# Patient Record
Sex: MALE | Race: WHITE | URBAN - METROPOLITAN AREA
[De-identification: names, ages, dates, MRNs, and addresses within clinical notes are randomized per-mention and may not be internally consistent; named-entity substitution may affect disease eponyms.]

---

## 2017-03-03 DIAGNOSIS — G89.29 CHRONIC ABDOMINAL PAIN: ICD-10-CM

## 2017-03-03 DIAGNOSIS — I10 ESSENTIAL HYPERTENSION, BENIGN: ICD-10-CM

## 2017-03-03 DIAGNOSIS — R10.9 CHRONIC ABDOMINAL PAIN: ICD-10-CM

## 2017-03-03 RX ORDER — PANTOPRAZOLE SODIUM 40 MG/1
TABLET, DELAYED RELEASE ORAL
Qty: 90 TABLET | Refills: 0 | Status: SHIPPED | OUTPATIENT
Start: 2017-03-03 | End: 2017-04-27

## 2017-03-03 NOTE — LETTER
[facility] PRIMARY [facility]ARE [facility]IN[facility]  [address]  [address] [facility] [address]      March 3, 2017      Miki Busch  [address]  [place] [address]        Dear Miki,    This letter is a reminder that you are due to see your Primary Care Provider for an Annual Visit and needed labs. You must be seen by your Primary Care Provider on a yearly basis and have appropriate labs drawn for continued care and prescription refills. Please call [phone] to schedule an appointment for an Annual Visit with Dr Mundo JAMES.     [facility],    Primary Care Center

## 2017-03-03 NOTE — TELEPHONE ENCOUNTER
pantprazole   Last Written Prescription Date:  3/9/16  Last Fill Quantity: 90,   # refills: 3  Last Office Visit : 3/9/16  Future Office visit:  No future appt letter sent

## 2017-03-15 DIAGNOSIS — I10 ESSENTIAL HYPERTENSION, BENIGN: ICD-10-CM

## 2017-03-15 RX ORDER — METOPROLOL TARTRATE 50 MG
50 TABLET ORAL 2 TIMES DAILY
Qty: 60 TABLET | Refills: 0 | Status: SHIPPED | OUTPATIENT
Start: 2017-03-15 | End: 2017-04-14

## 2017-03-15 NOTE — TELEPHONE ENCOUNTER
Metoprolol  Last Written Prescription Date: 3/9/16  Last Fill Quantity: 180, # refills: 3  Last Office Visit with G, P or [facility] prescribing provider: 3/9/16  No future appt letter sent       Potassium   Date Value Ref Range Status   03/09/2016 4.8 3.4 - 5.3 mmol/L Final     Creatinine   Date Value Ref Range Status   03/09/2016 0.86 0.66 - 1.25 mg/dL Final     BP Readings from Last 3 Encounters:   03/09/16 139/87   01/27/15 136/83   04/19/13 174/85

## 2017-03-21 DIAGNOSIS — G47.00 INSOMNIA, UNSPECIFIED: ICD-10-CM

## 2017-03-21 DIAGNOSIS — Z53.9 DIAGNOSIS NOT YET DEFINED: ICD-10-CM

## 2017-03-21 RX ORDER — MIRTAZAPINE 15 MG/1
TABLET, ORALLY DISINTEGRATING ORAL
Qty: 30 TABLET | Refills: 0 | Status: SHIPPED | OUTPATIENT
Start: 2017-03-21 | End: 2017-04-25

## 2017-03-21 NOTE — LETTER
[facility] PRIMARY CARE CLINIC  [address]  [address] [facility] [address]      March 21, 2017      Miki Busch  [address]  [place] [address]        Dear Miki,    This letter is a reminder that you are due to see your Primary Care Provider for an Annual Visit this month. You must be seen by your Primary Care Provider on a yearly basis and have appropriate labs drawn for continued care and prescription refills. Please call [phone] to schedule an appointment for an Annual Visit with Dr Peter Flower.     You have been given a 30 day supply/refill of your mirtazapine (REMERON SOL-TAB) 15 MG disintegrating tablet while you get your clinic visit/labs completed.      Regards,    Primary Care Center

## 2017-03-21 NOTE — TELEPHONE ENCOUNTER
mirtazapine (REMERON SOL-TAB) 15 MG disintegrating tablet      Last Written Prescription Date:  3/9/2016  Last Fill Quantity: 90,   # refills: 3  Last Office Visit : 6/9/2016  Future Office visit:  0     Appointment overdue.  Letter sent to patient.  Refill sent for 30 days supply.

## 2017-04-10 DIAGNOSIS — R10.9 CHRONIC ABDOMINAL PAIN: ICD-10-CM

## 2017-04-10 DIAGNOSIS — G47.00 INSOMNIA, UNSPECIFIED: ICD-10-CM

## 2017-04-10 DIAGNOSIS — I10 ESSENTIAL HYPERTENSION, BENIGN: ICD-10-CM

## 2017-04-10 DIAGNOSIS — M54.50 CHRONIC LOW BACK PAIN: ICD-10-CM

## 2017-04-10 DIAGNOSIS — G89.29 CHRONIC ABDOMINAL PAIN: ICD-10-CM

## 2017-04-10 DIAGNOSIS — Z53.9 DIAGNOSIS NOT YET DEFINED: ICD-10-CM

## 2017-04-10 DIAGNOSIS — G89.29 CHRONIC LOW BACK PAIN: ICD-10-CM

## 2017-04-10 DIAGNOSIS — Z00.00 ROUTINE GENERAL MEDICAL EXAMINATION AT A HEALTH CARE FACILITY: ICD-10-CM

## 2017-04-10 RX ORDER — GABAPENTIN 300 MG/1
CAPSULE ORAL
Qty: 270 CAPSULE | Refills: 3 | Status: SHIPPED | OUTPATIENT
Start: 2017-04-10 | End: 2017-04-10

## 2017-04-10 RX ORDER — GABAPENTIN 300 MG/1
CAPSULE ORAL
Qty: 90 CAPSULE | Refills: 0 | Status: SHIPPED | OUTPATIENT
Start: 2017-04-10 | End: 2017-04-27

## 2017-04-10 NOTE — LETTER
[facility] PRIMARY [facility]ARE CLINIC  [address]  4th Floor  [facility] [address]      April 10, 2017      Miki Busch  [address]  [place] [address]        Dear Miki,    This letter is a reminder that you are overdue to see your Primary Care Provider for an Annual Visit and needed labs. You must be seen by your Primary Care Provider on a yearly basis and have appropriate labs drawn for continued care and prescription refills. Please call [phone] to schedule an appointment for an Annual Visit with Dr Mundo JAMES.       You have been given a 30 day supply/refill of your   gabapentin while you get your clinic visit/labs completed.      Regards,    Primary Care Center

## 2017-04-10 NOTE — TELEPHONE ENCOUNTER
gabapentin (NEURONTIN) 300 MG capsule  Last Written Prescription Date:  3/9/16  Last Fill Quantity: 270,   # refills: 3  Last Office Visit with G, [facility] or [facility] prescribing provider: 3/9/16  Future Office visit:   none    BP Readings from Last 3 Encounters:   03/09/16 139/87   01/27/15 136/83   04/19/13 174/85     Creatinine   Date Value Ref Range Status   03/09/2016 0.86 0.66 - 1.25 mg/dL Final   ]  appt letter sent.

## 2017-04-14 DIAGNOSIS — I10 ESSENTIAL HYPERTENSION, BENIGN: ICD-10-CM

## 2017-04-14 RX ORDER — METOPROLOL TARTRATE 50 MG
50 TABLET ORAL 2 TIMES DAILY
Qty: 60 TABLET | Refills: 0 | Status: SHIPPED | OUTPATIENT
Start: 2017-04-14 | End: 2017-04-27

## 2017-04-25 DIAGNOSIS — Z53.9 DIAGNOSIS NOT YET DEFINED: ICD-10-CM

## 2017-04-25 DIAGNOSIS — G47.00 INSOMNIA, UNSPECIFIED: ICD-10-CM

## 2017-04-25 DIAGNOSIS — G47.00 INSOMNIA: Primary | ICD-10-CM

## 2017-04-25 RX ORDER — MIRTAZAPINE 15 MG/1
TABLET, ORALLY DISINTEGRATING ORAL
Qty: 30 TABLET | Refills: 0 | Status: SHIPPED | OUTPATIENT
Start: 2017-04-25 | End: 2017-04-27

## 2017-04-25 NOTE — TELEPHONE ENCOUNTER
mirtazapine (REMERON SOL-TAB) 15 MG ODT tab      Last Written Prescription Date:  3/21/2017  Last Fill Quantity: 30,   # refills: 1  Last Office Visit : 3/9/2016  Future Office visit:  4/27/2017    Patient did schedule his annual visit - pending in 2 days.  I will refill Rx for 30 days and ask for extra refills on clinic visit notes.

## 2017-04-27 ENCOUNTER — OFFICE VISIT (OUTPATIENT)
Dept: FAMILY MEDICINE | Facility: CLINIC | Age: 72
End: 2017-04-27

## 2017-04-27 VITALS — SYSTOLIC BLOOD PRESSURE: 133 MMHG | HEART RATE: 84 BPM | DIASTOLIC BLOOD PRESSURE: 89 MMHG

## 2017-04-27 DIAGNOSIS — G89.29 CHRONIC ABDOMINAL PAIN: ICD-10-CM

## 2017-04-27 DIAGNOSIS — R10.9 CHRONIC ABDOMINAL PAIN: ICD-10-CM

## 2017-04-27 DIAGNOSIS — Z53.9 DIAGNOSIS NOT YET DEFINED: ICD-10-CM

## 2017-04-27 DIAGNOSIS — I10 ESSENTIAL HYPERTENSION, BENIGN: ICD-10-CM

## 2017-04-27 DIAGNOSIS — Z00.00 ROUTINE GENERAL MEDICAL EXAMINATION AT A HEALTH CARE FACILITY: ICD-10-CM

## 2017-04-27 DIAGNOSIS — E78.00 HIGH BLOOD CHOLESTEROL: ICD-10-CM

## 2017-04-27 DIAGNOSIS — G47.00 INSOMNIA, UNSPECIFIED: ICD-10-CM

## 2017-04-27 DIAGNOSIS — E78.00 HIGH BLOOD CHOLESTEROL: Primary | ICD-10-CM

## 2017-04-27 DIAGNOSIS — G89.29 CHRONIC LOW BACK PAIN WITHOUT SCIATICA, UNSPECIFIED BACK PAIN LATERALITY: ICD-10-CM

## 2017-04-27 DIAGNOSIS — G47.00 INSOMNIA, UNSPECIFIED TYPE: ICD-10-CM

## 2017-04-27 DIAGNOSIS — M54.50 CHRONIC LOW BACK PAIN WITHOUT SCIATICA, UNSPECIFIED BACK PAIN LATERALITY: ICD-10-CM

## 2017-04-27 LAB
ALBUMIN SERPL-MCNC: 3.9 G/DL (ref 3.4–5)
ALP SERPL-CCNC: 94 U/L (ref 40–150)
ALT SERPL W P-5'-P-CCNC: 12 U/L (ref 0–70)
ANION GAP SERPL CALCULATED.3IONS-SCNC: 4 MMOL/L (ref 3–14)
AST SERPL W P-5'-P-CCNC: 10 U/L (ref 0–45)
BILIRUB SERPL-MCNC: 0.4 MG/DL (ref 0.2–1.3)
BUN SERPL-MCNC: 8 MG/DL (ref 7–30)
CALCIUM SERPL-MCNC: 8.6 MG/DL (ref 8.5–10.1)
CHLORIDE SERPL-SCNC: 101 MMOL/L (ref 94–109)
CHOLEST SERPL-MCNC: 207 MG/DL
CO2 SERPL-SCNC: 32 MMOL/L (ref 20–32)
CREAT SERPL-MCNC: 0.72 MG/DL (ref 0.66–1.25)
GFR SERPL CREATININE-BSD FRML MDRD: NORMAL ML/MIN/1.7M2
GLUCOSE SERPL-MCNC: 79 MG/DL (ref 70–99)
HDLC SERPL-MCNC: 46 MG/DL
LDLC SERPL CALC-MCNC: 120 MG/DL
NONHDLC SERPL-MCNC: 161 MG/DL
POTASSIUM SERPL-SCNC: 4.2 MMOL/L (ref 3.4–5.3)
PROT SERPL-MCNC: 7.2 G/DL (ref 6.8–8.8)
SODIUM SERPL-SCNC: 138 MMOL/L (ref 133–144)
TRIGL SERPL-MCNC: 208 MG/DL

## 2017-04-27 RX ORDER — METOPROLOL TARTRATE 50 MG
50 TABLET ORAL 2 TIMES DAILY
Qty: 180 TABLET | Refills: 3 | Status: SHIPPED | OUTPATIENT
Start: 2017-04-27 | End: 2018-02-23

## 2017-04-27 RX ORDER — PANTOPRAZOLE SODIUM 40 MG/1
TABLET, DELAYED RELEASE ORAL
Qty: 90 TABLET | Refills: 3 | Status: SHIPPED | OUTPATIENT
Start: 2017-04-27 | End: 2018-02-23

## 2017-04-27 RX ORDER — MIRTAZAPINE 15 MG/1
TABLET, ORALLY DISINTEGRATING ORAL
Qty: 90 TABLET | Refills: 3 | Status: SHIPPED | OUTPATIENT
Start: 2017-04-27 | End: 2018-02-23

## 2017-04-27 RX ORDER — GABAPENTIN 300 MG/1
CAPSULE ORAL
Qty: 90 CAPSULE | Refills: 3 | Status: SHIPPED | OUTPATIENT
Start: 2017-04-27 | End: 2017-06-30

## 2017-04-27 ASSESSMENT — PAIN SCALES - GENERAL: PAINLEVEL: NO PAIN (0)

## 2017-04-27 NOTE — NURSING NOTE
Chief Complaint   Patient presents with     Refill Request     Patient here for medication refill.       Lidya Oquendo CMA at 4:49 PM on 4/27/2017.

## 2017-04-27 NOTE — PATIENT INSTRUCTIONS
[facility] Medication Refill Request Information:  * Please contact your pharmacy regarding ANY request for medication refills.  ** [facility] Prescription Fax = [phone]  * Please allow 3 business days for routine medication refills.  * Please allow 5 business days for controlled substance medication refills.     [facility] Test Result notification information:  *You will be notified with in 7-10 days of your appointment day regarding the results of your test.  If you are on MyChart you will be notified as soon as the provider has reviewed the results and signed off on them.    [facility] [phone]

## 2017-04-27 NOTE — MR AVS SNAPSHOT
After Visit Summary   4/27/2017    Miki Busch    MRN: [id number]           Patient Information     Date Of Birth          1945        Visit Information        Provider Department      4/27/2017 4:35 PM Peter Flower MD [facility]        Today's Diagnoses     High blood cholesterol    -  1    Insomnia, unspecified type        Essential hypertension, benign        Chronic low back pain without sciatica, unspecified back pain laterality        DIAGNOSIS NOT YET DEFINED        Chronic abdominal pain        Routine general medical examination at a health care facility        Insomnia, unspecified          Care Instructions    [facility] Center Medication Refill Request Information:  * Please contact your pharmacy regarding ANY request for medication refills.  ** [facility] Prescription Fax = [phone]  * Please allow 3 business days for routine medication refills.  * Please allow 5 business days for controlled substance medication refills.     [facility] Test Result notification information:  *You will be notified with in 7-10 days of your appointment day regarding the results of your test.  If you are on MyChart you will be notified as soon as the provider has reviewed the results and signed off on them.    Primary Care Center [phone]           Follow-ups after your visit        Follow-up notes from your care team     Return in about 1 year (around 4/27/2018).      Future tests that were ordered for you today     Open Future Orders        Priority Expected Expires Ordered    Lipid panel reflex to direct LDL Routine 4/27/2017 5/11/2017 4/27/2017    Comprehensive metabolic panel Routine 4/27/2017 5/11/2017 4/27/2017            Who to contact     Please call your clinic at [phone] to:    Ask questions about your health    Make or cancel appointments    Discuss your medicines    Learn about your test results    Speak to your doctor   If you have compliments or  concerns about an experience at your clinic, or if you wish to file a complaint, please contact [facility] Patient Relations at [phone] or email us at [email or web address]         Additional Information About Your Visit        [employer] Information     [employer] gives you secure access to your electronic health record. If you see a primary care provider, you can also send messages to your care team and make appointments. If you have questions, please call your primary care clinic.  If you do not have a primary care provider, please call [phone] and they will assist you.      [employer] is an electronic gateway that provides easy, online access to your medical records. With [employer], you can request a clinic appointment, read your test results, renew a prescription or communicate with your care team.     To access your existing account, please contact your [facility] or call [phone] for assistance.        Care EveryWhere ID     This is your Care EveryWhere ID. This could be used by other organizations to access your [facility] medical records  [id number]        Your Vitals Were     Pulse                   84            Blood Pressure from Last 3 Encounters:   04/27/17 133/89   03/09/16 139/87   01/27/15 136/83    Weight from Last 3 Encounters:   03/09/16 80.6 kg (177 lb 11.2 oz)   01/27/15 84.8 kg (187 lb)   04/19/13 83.3 kg (183 lb 9.6 oz)                 Where to get your medicines      These medications were sent to [facility]/pharmacy #[id number] - [place], [place] - [address] [place] [address]     Phone:  [phone]     gabapentin 300 MG capsule    metoprolol 50 MG tablet    mirtazapine 15 MG ODT tab    pantoprazole 40 MG EC tablet          Primary Care Provider Office Phone # Fax #    Peter Flower [phone]       PRIMARY CARE CENTER [address] [place] [address]        Thank  you!     Thank you for choosing [facility] PRIMARY [facility]ARE CLINIC  for your care. Our goal is always to provide you with excellent care. Hearing back from our patients is one way we can continue to improve our services. Please take a few minutes to complete the written survey that you may receive in the mail after your visit with us. Thank you!             Your Updated Medication List - Protect others around you: Learn how to safely use, store and throw away your medicines at www.disposemymeds.org.          This list is accurate as of: 4/27/17  5:01 PM.  Always use your most recent med list.                   Brand Name Dispense Instructions for use    ergocalciferol 8000 UNIT/ML drops    DRISDOL    60 mL    Take 1.25 mLs by mouth daily.       gabapentin 300 MG capsule    NEURONTIN    90 capsule    One by mouth three times a day.       metoprolol 50 MG tablet    LOPRESSOR    180 tablet    Take 1 tablet (50 mg) by mouth 2 times daily       mirtazapine 15 MG ODT tab    REMERON SOL-TAB    90 tablet    Take 1 tablet sublingually at bedtime       pantoprazole 40 MG EC tablet    PROTONIX    90 tablet    Take 1 tablet by mouth daily       traZODone 50 MG tablet    DESYREL    90 tablet    Take one half to one full tab at bedtime for sleep as needed

## 2017-05-08 NOTE — PROGRESS NOTES
SUBJECTIVE:    Pt is a 71 year old male with pmh of     Patient Active Problem List   Diagnosis     Chronic low back pain     Chronic abdominal pain     Biliary sludge     Malnutrition (H)     CARDIOVASCULAR SCREENING; LDL GOAL LESS THAN 160     Advanced directives, counseling/discussion     Essential hypertension, benign     Hypertension goal BP (blood pressure) < 140/90       who is here for evaluation of had concerns including Refill Request.    Here for a few things.  1--takes PPI for GERD, works well, tolearted, will refill.  2--insomnia: uses Remeron, works well, tolerated  3--shots up to date  4--colonoscopy due   5--htn good control on well toelrated beta blocker  6--back pain: well managed w/ gabapentin which is well tolerated    All meds chronic, well tolerated and helpful    No acute c/o, here as refills expiring    FH: mom  of rheumatic fever complications, dad had DM and  around age 80 of MI    Past Medical History:   Diagnosis Date     Alcoholism (H)      Chronic low back pain      CVA (cerebral infarction)      Irritable bowel syndrome      Left-sided muscle weakness      Rectal abscess Oct 2010     Past Surgical History:   Procedure Laterality Date     ESOPHAGOSCOPY, GASTROSCOPY, DUODENOSCOPY (EGD), COMBINED  6/10/2011    Procedure:COMBINED ESOPHAGOSCOPY, GASTROSCOPY, DUODENOSCOPY (EGD); Surgeon:CAROLA MORENO; Location:[facility] OR     INCISION AND DRAINAGE, ABSCESS, SIMPLE             Current Outpatient Prescriptions   Medication Sig Dispense Refill     mirtazapine (REMERON SOL-TAB) 15 MG ODT tab Take 1 tablet sublingually at bedtime 90 tablet 3     metoprolol (LOPRESSOR) 50 MG tablet Take 1 tablet (50 mg) by mouth 2 times daily 180 tablet 3     gabapentin (NEURONTIN) 300 MG capsule One by mouth three times a day. 90 capsule 3     pantoprazole (PROTONIX) 40 MG EC tablet Take 1 tablet by mouth daily 90 tablet 3     traZODone (DESYREL) 50 MG tablet Take one half to one full tab at bedtime  for sleep as needed 90 tablet 3     ergocalciferol (DRISDOL) 8000 UNIT/ML drops Take 1.25 mLs by mouth daily. 60 mL      Allergies   Allergen Reactions     Penicillins        Social History   Substance Use Topics     Smoking status: Current Every Day Smoker     Packs/day: 0.25     Types: Cigarettes     Smokeless tobacco: Never Used     Alcohol use No       Ten pt ROS completed, o/w neg  OBJECTIVE:  /89  Pulse 84  GENERAL APPEARANCE: Alert, no acute distress  EYES: PERRL, EOM normal, conjunctiva and lids normal  HENT: Ears and TMs normal, oral mucosa and posterior oropharynx normal  NECK: No adenopathy,masses or thyromegaly  RESP: lungs clear to auscultation   CV: normal rate, regular rhythm, no murmur or gallop  ABDOMEN: soft, no organomegaly, masses or tenderness  LYMPHATICS: No cervical, supraclavicular or inguinal adenopathy  MS: extremities normal, no peripheral edema  NEURO: Alert, oriented, speech and mentation normal  PSYCHE: mentation appears normal, affect and mood normal    ASSESSMENT/PLAN:    GERD: cont PPI  Insomnia: cont Remeron  Back pain: cont gabapentin  Htn: cont beta blocker    See me in a year earlier prn  RONN MANRIQUE MD

## 2017-06-30 DIAGNOSIS — R10.9 CHRONIC ABDOMINAL PAIN: ICD-10-CM

## 2017-06-30 DIAGNOSIS — Z53.9 DIAGNOSIS NOT YET DEFINED: ICD-10-CM

## 2017-06-30 DIAGNOSIS — M54.50 CHRONIC LOW BACK PAIN WITHOUT SCIATICA, UNSPECIFIED BACK PAIN LATERALITY: ICD-10-CM

## 2017-06-30 DIAGNOSIS — G89.29 CHRONIC ABDOMINAL PAIN: ICD-10-CM

## 2017-06-30 DIAGNOSIS — Z00.00 ROUTINE GENERAL MEDICAL EXAMINATION AT A HEALTH CARE FACILITY: ICD-10-CM

## 2017-06-30 DIAGNOSIS — I10 ESSENTIAL HYPERTENSION, BENIGN: ICD-10-CM

## 2017-06-30 DIAGNOSIS — G89.29 CHRONIC LOW BACK PAIN WITHOUT SCIATICA, UNSPECIFIED BACK PAIN LATERALITY: ICD-10-CM

## 2017-06-30 DIAGNOSIS — G47.00 INSOMNIA, UNSPECIFIED: ICD-10-CM

## 2017-06-30 RX ORDER — GABAPENTIN 300 MG/1
CAPSULE ORAL
Qty: 270 CAPSULE | Refills: 1 | Status: SHIPPED | OUTPATIENT
Start: 2017-06-30 | End: 2018-02-23

## 2018-01-01 DIAGNOSIS — H40.013 OPEN ANGLE GLAUCOMA CUPPING OF OPTIC DISCS, BILATERAL: ICD-10-CM

## 2018-01-01 RX ORDER — LATANOPROST 50 UG/ML
1 SOLUTION/ DROPS OPHTHALMIC AT BEDTIME
Qty: 7.5 ML | Refills: 1 | Status: SHIPPED | OUTPATIENT
Start: 2018-01-01 | End: 2019-01-01

## 2018-02-09 ENCOUNTER — OFFICE VISIT (OUTPATIENT)
Dept: OPHTHALMOLOGY | Facility: CLINIC | Age: 73
End: 2018-02-09
Attending: OPHTHALMOLOGY
Payer: COMMERCIAL

## 2018-02-09 DIAGNOSIS — H25.13 NUCLEAR SENILE CATARACT OF BOTH EYES: ICD-10-CM

## 2018-02-09 DIAGNOSIS — H35.3190 NONEXUDATIVE SENILE MACULAR DEGENERATION OF RETINA: Primary | ICD-10-CM

## 2018-02-09 DIAGNOSIS — H40.009 GLAUCOMA SUSPECT: ICD-10-CM

## 2018-02-09 DIAGNOSIS — H40.013 OPEN ANGLE GLAUCOMA CUPPING OF OPTIC DISCS, BILATERAL: ICD-10-CM

## 2018-02-09 PROCEDURE — 92015 DETERMINE REFRACTIVE STATE: CPT | Mod: ZF

## 2018-02-09 PROCEDURE — 92133 CPTRZD OPH DX IMG PST SGM ON: CPT | Mod: ZF | Performed by: OPHTHALMOLOGY

## 2018-02-09 PROCEDURE — G0463 HOSPITAL OUTPT CLINIC VISIT: HCPCS | Mod: ZF

## 2018-02-09 PROCEDURE — 92134 CPTRZ OPH DX IMG PST SGM RTA: CPT | Mod: ZF | Performed by: OPHTHALMOLOGY

## 2018-02-09 RX ORDER — LATANOPROST 50 UG/ML
1 SOLUTION/ DROPS OPHTHALMIC AT BEDTIME
Qty: 1 BOTTLE | Refills: 11 | Status: SHIPPED | OUTPATIENT
Start: 2018-02-09 | End: 2018-01-01

## 2018-02-09 ASSESSMENT — REFRACTION_MANIFEST
OS_CYLINDER: +0.25
OD_AXIS: 010
OS_SPHERE: -2.50
OS_AXIS: 105
OD_ADD: +1.50
OD_CYLINDER: +1.25
OD_SPHERE: -7.75
OS_ADD: +1.50

## 2018-02-09 ASSESSMENT — VISUAL ACUITY
OS_CC+: +1
OD_CC: 20/70
OS_CC: 20/40
METHOD: SNELLEN - LINEAR
CORRECTION_TYPE: GLASSES

## 2018-02-09 ASSESSMENT — REFRACTION_WEARINGRX
OD_AXIS: 010
OS_SPHERE: -2.50
OD_CYLINDER: +1.25
OD_SPHERE: -7.50
SPECS_TYPE: SVL
OS_AXIS: 105
OS_CYLINDER: +0.25

## 2018-02-09 ASSESSMENT — TONOMETRY
OS_IOP_MMHG: 27
OD_IOP_MMHG: 24
IOP_METHOD: TONOPEN

## 2018-02-09 ASSESSMENT — SLIT LAMP EXAM - LIDS
COMMENTS: NORMAL
COMMENTS: NORMAL

## 2018-02-09 ASSESSMENT — CUP TO DISC RATIO
OD_RATIO: 0.85
OS_RATIO: 0.9

## 2018-02-09 ASSESSMENT — EXTERNAL EXAM - LEFT EYE: OS_EXAM: NORMAL

## 2018-02-09 ASSESSMENT — CONF VISUAL FIELD
OD_NORMAL: 1
OS_NORMAL: 1

## 2018-02-09 ASSESSMENT — EXTERNAL EXAM - RIGHT EYE: OD_EXAM: NORMAL

## 2018-02-09 NOTE — NURSING NOTE
Chief Complaints and History of Present Illnesses   Patient presents with     COMPREHENSIVE EYE EXAM     Cataract Eval     HPI    Affected eye(s):  Both   Symptoms:     No blurred vision   No decreased vision   No distorted vision   No floaters   No flashes      Frequency:  Constant       Do you have eye pain now?:  No      Comments:  Pt stated stable vision with glasses on, difficulty with night driving over the last 12 month.  Leobardo Griffin  1:57 PM February 9, 2018

## 2018-02-09 NOTE — MR AVS SNAPSHOT
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, bilateral           Follow-ups after your visit        Your next 10 appointments already scheduled     Mar 06, 2018   Procedure with Lionel Barrios MD   [facility] PeriOP [facility] (--)    [address]  [place] [address]   [phone]            Mar 06, 2018   Procedure with Lionel Barrios MD   [facility] (--)    [address]  [place] [address]   [phone]            Mar 07, 2018  3:00 PM CST   Post-Op with Lionel Barrios MD   Eye Clinic ([facility])    [facility] [address] Clin [address] [place] [address]   [phone]            Mar 16, 2018  2:15 PM CDT   Post-Op with Lionel Barrios MD   Eye Clinic ([facility])    [address] [address]   [phone]            Mar 21, 2018  3:00 PM CDT   Post-Op with Lionel Barrios MD   Eye Clinic ([facility])    [facility] D[facility] [address] Clin [address] [place] [address]   [phone]            Mar 28, 2018  3:00 PM CDT   Post-Op with Lionel Bariros MD   Eye Clinic ([facility])    [address] [address]   [phone]            Apr 18, 2018  3:00 PM CDT   Post-Op with Lionel Barrios MD   Eye Clinic ([facility])    [address]  [address]  [facility] [address]   [phone]              Who to contact     Please call your clinic at [phone] to:    Ask questions about your health    Make or cancel appointments    Discuss your medicines    Learn about your test results    Speak to your doctor            Additional Information About Your Visit        MyChart Information     [employer] gives you secure access to your electronic health record. If you see a primary care provider, you can also send messages to your care team and make appointments. If you have questions, please call your primary care clinic.  If you do not have a primary care provider, please call [phone] and they will assist you.      [employer] is an electronic gateway that provides easy, online access to your medical records. With [employer], you can request a clinic appointment, read your test results, renew a prescription or communicate with your care team.     To access your existing account, please contact your [facility] or call [phone] for assistance.        Care EveryWhere ID     This is your Care EveryWhere ID. This could be used by other organizations to access your [place] medical records  [id number]         Blood Pressure from Last 3 Encounters:   04/27/17 133/89   03/09/16 139/87   01/27/15 136/83    Weight from Last 3 Encounters:   03/09/16 80.6 kg (177 lb 11.2 oz)   01/27/15 84.8 kg (187 lb)   04/19/13 83.3 kg (183 lb 9.6 oz)              We Performed the Following     OCT Optic Nerve RNFL Optovue OU (both eyes)     OCT Retina Spectralis OU (both eyes)     Mireille-Operative Worksheet          Today's Medication Changes          These changes are accurate as of 2/9/18 11:59 PM.  If you have any questions, ask your nurse or doctor.               Start taking these medicines.        Dose/Directions    latanoprost 0.005 % ophthalmic solution   Commonly known as:  XALATAN   Used for:  Open angle glaucoma cupping of optic discs,  bilateral   Started by:  Paulie Gtz,         Dose:  1 drop   Place 1 drop into both eyes At Bedtime   Quantity:  1 Bottle   Refills:  11            Where to get your medicines      These medications were sent to [facility]/pharmacy #[id number] - [facility] [address] [place] [address]  [address] [place] [address]     Phone:  [phone]     latanoprost 0.005 % ophthalmic solution                Primary Care Provider Office Phone # Fax #    Peter Flower [phone]       [address] [address]        Equal Access to Services     [facility]: Hadii aad ku hadasho Soomaali, waaxda luqadaha, qaybta kaalmada adeegyada, lalo link . So [facility] [phone].    ATENCIÓN: Si habla español, tiene a shelby disposición servicios gratuitos de asistencia lingüística. [facility] [phone].    We comply with applicable federal civil rights laws and [place] laws. We do not discriminate on the basis of race, color, national origin, age, disability, sex, sexual orientation, or gender identity.            Thank you!     Thank you for choosing EYE CLINIC  for your care. Our goal is always to provide you with excellent care. Hearing back from our patients is one way we can continue to improve our services. Please take a few minutes to complete the written survey that you may receive in the mail after your visit with us. Thank you!             Your Updated Medication List - Protect others around you: Learn how to safely use, store and throw away your medicines at www.disposemymeds.org.          This list is accurate as of 2/9/18 11:59 PM.  Always use your most recent med list.                   Brand Name Dispense Instructions for use Diagnosis    ergocalciferol 8000 UNIT/ML drops    DRISDOL    60 mL    Take 1.25 mLs by mouth daily.    Chronic low back pain       gabapentin 300 MG capsule    NEURONTIN    270 capsule    One by mouth three times a day.     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, bilateral       metoprolol tartrate 50 MG tablet    LOPRESSOR    180 tablet    Take 1 tablet (50 mg) by mouth 2 times daily    Essential hypertension, benign       mirtazapine 15 MG ODT tab    REMERON SOL-TAB    90 tablet    Take 1 tablet sublingually at bedtime    Insomnia, unspecified type       pantoprazole 40 MG EC tablet    PROTONIX    90 tablet    Take 1 tablet by mouth daily    Chronic abdominal pain, Essential hypertension, benign       traZODone 50 MG tablet    DESYREL    90 tablet    Take one half to one full tab at bedtime for sleep as needed    Insomnia, unspecified, DIAGNOSIS NOT YET DEFINED, Chronic low back pain, Chronic abdominal pain, Essential hypertension, benign, Routine general medical examination at a health care facility

## 2018-02-09 NOTE — PROGRESS NOTES
Cataract evaluation    Patient has noticed decreasing vision both eyes over the past few years.  He has more trouble reading street signs and reading at near.  He has significant glare at night.  Moderate to severe.      No family history of glaucoma or AGE RELATED MACULAR DEGENERATION.      OcMeds:  AREDS II  ATs    A/P:  1. Cataracts both eyes  -dense and visually significant OD > OS  Risks/benefits/alternatives discussed  Patient wishes to proceed with surgery    Good dilation  No Pseudoexfoliation  No Guttae  No IFIS  Plan emmetropia  Femto candidate  Standard IOL  MAC/topical    2. Glaucoma suspect  -IOP elevated with cupping both eyes  -retinal nerve fiber layer and pachymetry today  -Start latanoprost at bedtime both eyes  -perez visual field (HVF) at f/u    3. 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 I personally obtained the chief complaint(s) and history of present illness.  I confirmed and edited as necessary the review of systems, past medical/surgical history, family history, social history, and examination findings as documented by others; and I examined the patient myself. I personally reviewed the relevant tests, images, and reports as documented above. I formulated and edited as necessary the assessment and plan and discussed the findings and management plan with the patient and family.     Paulie Gtz,

## 2018-02-12 ENCOUNTER — TELEPHONE (OUTPATIENT)
Dept: OPHTHALMOLOGY | Facility: CLINIC | Age: 73
End: 2018-02-12

## 2018-02-15 ENCOUNTER — HOSPITAL ENCOUNTER (OUTPATIENT)
Facility: CLINIC | Age: 73
End: 2018-02-15
Attending: OPHTHALMOLOGY | Admitting: OPHTHALMOLOGY
Payer: COMMERCIAL

## 2018-02-15 DIAGNOSIS — H25.12 NUCLEAR SENILE CATARACT OF LEFT EYE: Primary | ICD-10-CM

## 2018-02-15 DIAGNOSIS — H25.13 NUCLEAR SENILE CATARACT OF BOTH EYES: Primary | ICD-10-CM

## 2018-02-16 ENCOUNTER — TELEPHONE (OUTPATIENT)
Dept: OPHTHALMOLOGY | Facility: CLINIC | Age: 73
End: 2018-02-16

## 2018-02-19 ENCOUNTER — TELEPHONE (OUTPATIENT)
Dept: OPHTHALMOLOGY | Facility: CLINIC | Age: 73
End: 2018-02-19

## 2018-02-21 ENCOUNTER — TELEPHONE (OUTPATIENT)
Dept: OPHTHALMOLOGY | Facility: CLINIC | Age: 73
End: 2018-02-21

## 2018-02-23 ENCOUNTER — OFFICE VISIT (OUTPATIENT)
Dept: FAMILY MEDICINE | Facility: CLINIC | Age: 73
End: 2018-02-23
Payer: COMMERCIAL

## 2018-02-23 VITALS
WEIGHT: 170.6 LBS | DIASTOLIC BLOOD PRESSURE: 86 MMHG | OXYGEN SATURATION: 93 % | HEART RATE: 96 BPM | RESPIRATION RATE: 20 BRPM | SYSTOLIC BLOOD PRESSURE: 124 MMHG | BODY MASS INDEX: 24.48 KG/M2

## 2018-02-23 DIAGNOSIS — M54.50 CHRONIC LOW BACK PAIN WITHOUT SCIATICA, UNSPECIFIED BACK PAIN LATERALITY: ICD-10-CM

## 2018-02-23 DIAGNOSIS — Z00.00 ROUTINE GENERAL MEDICAL EXAMINATION AT A HEALTH CARE FACILITY: ICD-10-CM

## 2018-02-23 DIAGNOSIS — R10.9 CHRONIC ABDOMINAL PAIN: ICD-10-CM

## 2018-02-23 DIAGNOSIS — G47.00 INSOMNIA, UNSPECIFIED TYPE: ICD-10-CM

## 2018-02-23 DIAGNOSIS — Z53.9 DIAGNOSIS NOT YET DEFINED: ICD-10-CM

## 2018-02-23 DIAGNOSIS — I10 ESSENTIAL HYPERTENSION, BENIGN: ICD-10-CM

## 2018-02-23 DIAGNOSIS — G89.29 CHRONIC LOW BACK PAIN WITHOUT SCIATICA, UNSPECIFIED BACK PAIN LATERALITY: ICD-10-CM

## 2018-02-23 DIAGNOSIS — G89.29 CHRONIC ABDOMINAL PAIN: ICD-10-CM

## 2018-02-23 LAB
ALBUMIN SERPL-MCNC: 3.9 G/DL (ref 3.4–5)
ALP SERPL-CCNC: 100 U/L (ref 40–150)
ALT SERPL W P-5'-P-CCNC: 14 U/L (ref 0–70)
ANION GAP SERPL CALCULATED.3IONS-SCNC: 6 MMOL/L (ref 3–14)
AST SERPL W P-5'-P-CCNC: 12 U/L (ref 0–45)
BILIRUB SERPL-MCNC: 0.4 MG/DL (ref 0.2–1.3)
BUN SERPL-MCNC: 18 MG/DL (ref 7–30)
CALCIUM SERPL-MCNC: 8.4 MG/DL (ref 8.5–10.1)
CHLORIDE SERPL-SCNC: 104 MMOL/L (ref 94–109)
CHOLEST SERPL-MCNC: 200 MG/DL
CO2 SERPL-SCNC: 28 MMOL/L (ref 20–32)
CREAT SERPL-MCNC: 0.89 MG/DL (ref 0.66–1.25)
GFR SERPL CREATININE-BSD FRML MDRD: 83 ML/MIN/1.7M2
GLUCOSE SERPL-MCNC: 135 MG/DL (ref 70–99)
HDLC SERPL-MCNC: 43 MG/DL
LDLC SERPL CALC-MCNC: 107 MG/DL
NONHDLC SERPL-MCNC: 156 MG/DL
POTASSIUM SERPL-SCNC: 4.2 MMOL/L (ref 3.4–5.3)
PROT SERPL-MCNC: 7.3 G/DL (ref 6.8–8.8)
SODIUM SERPL-SCNC: 137 MMOL/L (ref 133–144)
TRIGL SERPL-MCNC: 245 MG/DL

## 2018-02-23 RX ORDER — PANTOPRAZOLE SODIUM 40 MG/1
TABLET, DELAYED RELEASE ORAL
Qty: 90 TABLET | Refills: 3 | Status: SHIPPED | OUTPATIENT
Start: 2018-02-23 | End: 2019-01-01

## 2018-02-23 RX ORDER — METOPROLOL TARTRATE 50 MG
50 TABLET ORAL 2 TIMES DAILY
Qty: 180 TABLET | Refills: 3 | Status: SHIPPED | OUTPATIENT
Start: 2018-02-23 | End: 2019-01-01

## 2018-02-23 RX ORDER — GABAPENTIN 300 MG/1
CAPSULE ORAL
Qty: 270 CAPSULE | Refills: 3 | Status: SHIPPED | OUTPATIENT
Start: 2018-02-23 | End: 2019-01-01

## 2018-02-23 RX ORDER — MIRTAZAPINE 15 MG/1
TABLET, ORALLY DISINTEGRATING ORAL
Qty: 90 TABLET | Refills: 3 | Status: SHIPPED | OUTPATIENT
Start: 2018-02-23 | End: 2019-01-01

## 2018-02-23 ASSESSMENT — PAIN SCALES - GENERAL: PAINLEVEL: NO PAIN (0)

## 2018-02-23 NOTE — PATIENT INSTRUCTIONS
[facility]: [phone]     [facility] Medication Refill Request Information:  * Please contact your pharmacy regarding ANY request for medication refills.  ** [facility] Prescription Fax = [phone]  * Please allow 3 business days for routine medication refills.  * Please allow 5 business days for controlled substance medication refills.     [facility] Test Result notification information:  *You will be notified with in 7-10 days of your appointment day regarding the results of your test.  If you are on MyChart you will be notified as soon as the provider has reviewed the results and signed off on them.

## 2018-02-23 NOTE — NURSING NOTE
Chief Complaint   Patient presents with     Pre-Op Exam     right eye cataract on 3/6/18 ( [facility]) and left eye cataract ([facility]) on 3/20/18   Maritza Nelson LPN 3:30 PM on 2/23/2018    Rooming Note  Health Maintenance   Health Maintenance Due   Topic Date Due     HAYLIE QUESTIONNAIRE 1 YEAR  07/23/1963     PHQ-9 Q1YR  07/23/1963     ADVANCE DIRECTIVE PLANNING Q5 YRS  01/20/2017    All health maintenance items discussed and pended.    Maritza Nelson LPN 3:32 PM on 2/23/2018

## 2018-02-23 NOTE — MR AVS SNAPSHOT
After Visit Summary   2/23/2018    Miki Busch    MRN: [id number]           Patient Information     Date Of Birth          1945        Visit Information        Provider Department      2/23/2018 3:05 PM Peter Flower MD [facility]        Today's Diagnoses     Chronic low back pain without sciatica, unspecified back pain laterality        DIAGNOSIS NOT YET DEFINED        Chronic abdominal pain        Essential hypertension, benign        Routine general medical examination at a health care facility        Insomnia, unspecified type          Care Instructions    Primary Care Center: [phone]     Primary Care Center Medication Refill Request Information:  * Please contact your pharmacy regarding ANY request for medication refills.  ** [facility] Prescription Fax = [phone]  * Please allow 3 business days for routine medication refills.  * Please allow 5 business days for controlled substance medication refills.     Primary Care Center Test Result notification information:  *You will be notified with in 7-10 days of your appointment day regarding the results of your test.  If you are on MyChart you will be notified as soon as the provider has reviewed the results and signed off on them.          Follow-ups after your visit        Your next 10 appointments already scheduled     Feb 23, 2018  4:00 PM CST   LAB with  LAB    Health Lab ([facility])    [address] [place] [address] [phone]           Please do not eat 10-12 hours before your appointment if you are coming in fasting for labs on lipids, cholesterol, or glucose (sugar). This does not apply to pregnant women. Water, hot tea and black coffee (with nothing added) are okay. Do not drink other fluids, diet soda or chew gum.            Mar 06, 2018   Procedure with Lionel Barrios MD   [facility] (--)    [address]  [place] [address]   [phone]            Mar 06, 2018   Procedure with Lionel Barrios MD   [facility] (--)    [address]  [place] [address]   [phone]            Mar 07, 2018  3:00 PM CST   Post-Op with Lionel Barrios MD   Eye Clinic ([facility])    [address] [place] [address]   [phone]            Mar 16, 2018  2:15 PM CDT   Post-Op with Lionel Barrios MD   Eye Clinic ([facility])    [address] [place] [address]   [phone]            Mar 21, 2018  3:00 PM CDT   Post-Op with Lionel Barrios MD   Eye Clinic ([facility])    [address] [place] [address]   [phone]            Mar 28, 2018  3:00 PM CDT   Post-Op with Lionel Barrios MD   Eye Clinic ([facility])    [address] [place] [address]   [phone]            Apr 18, 2018  3:00 PM CDT   Post-Op with Lionel Barrios MD   Eye Clinic ([facility])    [address] [place] [address]   [phone]              Future tests that were ordered for you today     Open Future Orders        Priority Expected Expires Ordered    Lipid panel reflex to direct LDL Fasting Routine 2/23/2018 3/9/2018 2/23/2018    Comprehensive metabolic panel Routine 2/23/2018 3/9/2018 2/23/2018            Who to contact     Please call your clinic at [phone] to:    Ask questions about your health    Make or cancel appointments    Discuss your medicines    Learn about your test results    Speak to your doctor            Additional Information About Your Visit        MyChart Information     [employer] gives you secure access to your electronic health record. If you  see a primary care provider, you can also send messages to your care team and make appointments. If you have questions, please call your primary care clinic.  If you do not have a primary care provider, please call [phone] and they will assist you.      [employer] is an electronic gateway that provides easy, online access to your medical records. With [employer], you can request a clinic appointment, read your test results, renew a prescription or communicate with your care team.     To access your existing account, please contact your [facility] or call [phone] for assistance.        Care EveryWhere ID     This is your Care EveryWhere ID. This could be used by other organizations to access your [facility] medical records  [id number]        Your Vitals Were     Pulse Respirations Pulse Oximetry BMI (Body Mass Index)          96 20 93% 24.48 kg/m2         Blood Pressure from Last 3 Encounters:   02/23/18 124/86   04/27/17 133/89   03/09/16 139/87    Weight from Last 3 Encounters:   02/23/18 77.4 kg (170 lb 9.6 oz)   03/09/16 80.6 kg (177 lb 11.2 oz)   01/27/15 84.8 kg (187 lb)                 Where to get your medicines      These medications were sent to [facility]/pharmacy #[id number] [facility] [address] [place] [address]     Phone:  [phone]     gabapentin 300 MG capsule    metoprolol tartrate 50 MG tablet    mirtazapine 15 MG ODT tab    pantoprazole 40 MG EC tablet          Primary Care Provider Office Phone # Fax #    Peter Flower [phone]       [address] [address]        Equal Access to Services     JUAN R ARROYO AH: Hadii gus Servin, wadelmisda lumahsa, qaybta kaalmalalo martínez. So [facility] [phone].    ATENCIÓN: Si habla español, tiene a shelby disposición servicios gratuitos de asistencia lingüística. Llame al [phone].    We comply with  applicable federal civil rights laws and [place] laws. We do not discriminate on the basis of race, color, national origin, age, disability, sex, sexual orientation, or gender identity.            Thank you!     Thank you for choosing [facility]  for your care. Our goal is always to provide you with excellent care. Hearing back from our patients is one way we can continue to improve our services. Please take a few minutes to complete the written survey that you may receive in the mail after your visit with us. Thank you!             Your Updated Medication List - Protect others around you: Learn how to safely use, store and throw away your medicines at www.disposemymeds.org.          This list is accurate as of 2/23/18  3:53 PM.  Always use your most recent med list.                   Brand Name Dispense Instructions for use Diagnosis    ergocalciferol 8000 UNIT/ML drops    DRISDOL    60 mL    Take 1.25 mLs by mouth daily.    Chronic low back pain       gabapentin 300 MG capsule    NEURONTIN    270 capsule    One by mouth three times a day.    Chronic low back pain without sciatica, unspecified back pain laterality, DIAGNOSIS NOT YET DEFINED, Chronic abdominal pain, Essential hypertension, benign, Routine general medical examination at a health care facility       latanoprost 0.005 % ophthalmic solution    XALATAN    1 Bottle    Place 1 drop into both eyes At Bedtime    Open angle glaucoma cupping of optic discs, bilateral       metoprolol tartrate 50 MG tablet    LOPRESSOR    180 tablet    Take 1 tablet (50 mg) by mouth 2 times daily    Essential hypertension, benign       mirtazapine 15 MG ODT tab    REMERON SOL-TAB    90 tablet    Take 1 tablet sublingually at bedtime    Insomnia, unspecified type       pantoprazole 40 MG EC tablet    PROTONIX    90 tablet    Take 1 tablet by mouth daily    Chronic abdominal pain, Essential hypertension, benign       traZODone 50 MG tablet    DESYREL    90  tablet    Take one half to one full tab at bedtime for sleep as needed    Insomnia, unspecified, DIAGNOSIS NOT YET DEFINED, Chronic low back pain, Chronic abdominal pain, Essential hypertension, benign, Routine general medical examination at a health care facility

## 2018-02-23 NOTE — PROGRESS NOTES
[facility]   Peter Flower MD  02/23/2018     Chief Complaint:   Pre-op Exam      History of Present Illness:   Miki Busch is 72 year old male here at the request of Dr. Allen for cardiovascular, pulmonary, and perioperative risk assessment prior to surgery.  The intended surgical procedure is cataract removal, currently scheduled on 3/6/18 at [facility] for his right eye and on 3/20/18 at [facility] for his left eye.  A copy of this note will be sent to the surgeon.     Reason for surgery:  Mr. Busch reports he has opted for cataract excision for his bilateral cataracts as these are now interfering with his vision more.  He is planning to have both eyes done in the next month.      He was last seen about a year ago and generally has been doing fairly well.  He would like refills of his medications.  He is taking these as prescribed and has no side effects or other concerns with these.  His only new medication is eye drops for glaucoma.    Cardiovascular Risk:  This patient does not have chest pain with ambulation or walking up a flight of stairs.  There is not any chest pain with exercise.  He does not have a history of known cardiac disease.  There is not a history of stroke or valvular disease.    Pulmonary Risk:  In terms of risk factors for pulmonary complications, the patient does not have a history of lung problems.    Perioperative Complications:  The patient does not have a history of bleeding or clotting problems in the past. He has not had complications from past surgeries.  The patient does not have a family history of any anesthesia or surgical complications.     Review of Systems:   Pertinent items are noted in HPI and as below in patient entered answers.  All other systems are negative.  Answers for HPI/ROS submitted by the patient on 2/23/2018   General Symptoms: No  Skin Symptoms: No  HENT Symptoms: No  EYE SYMPTOMS: No  HEART SYMPTOMS: No  LUNG  SYMPTOMS: No  INTESTINAL SYMPTOMS: No  URINARY SYMPTOMS: No  REPRODUCTIVE SYMPTOMS: No  SKELETAL SYMPTOMS: No  BLOOD SYMPTOMS: No  NERVOUS SYSTEM SYMPTOMS: No  MENTAL HEALTH SYMPTOMS: No    Active Medications:      latanoprost (XALATAN) 0.005 % ophthalmic solution, Place 1 drop into both eyes At Bedtime, Disp: 1 Bottle, Rfl: 11     gabapentin (NEURONTIN) 300 MG capsule, One by mouth three times a day., Disp: 270 capsule, Rfl: 1     mirtazapine (REMERON SOL-TAB) 15 MG ODT tab, Take 1 tablet sublingually at bedtime, Disp: 90 tablet, Rfl: 3     metoprolol (LOPRESSOR) 50 MG tablet, Take 1 tablet (50 mg) by mouth 2 times daily, Disp: 180 tablet, Rfl: 3     pantoprazole (PROTONIX) 40 MG EC tablet, Take 1 tablet by mouth daily, Disp: 90 tablet, Rfl: 3     traZODone (DESYREL) 50 MG tablet, Take one half to one full tab at bedtime for sleep as needed, Disp: 90 tablet, Rfl: 3     ergocalciferol (DRISDOL) 8000 UNIT/ML drops, Take 1.25 mLs by mouth daily., Disp: 60 mL, Rfl:       Allergies:   Penicillins      Past Medical History:  Essential hypertension, benign  Cerebral infarction  Alcoholism  Chronic low back pain  Irritable bowel syndrome   Rectal abscess  Chronic abdominal pain      Past Surgical History:  Esophagogastroduodenoscopy 6/10/11  Incision and drainage     Family History:   Diabetes - father     Social History:   Marital Status:   Tobacco Use: Current daily smoker, 0.25 PPD.   Alcohol Use: history of alcohol abuse, now sober     Physical Exam:   /86 (BP Location: Right arm, Patient Position: Sitting, Cuff Size: Adult Regular)  Pulse 96  Resp 20  Wt 77.4 kg (170 lb 9.6 oz)  SpO2 93%  BMI 24.48 kg/m2     Constitutional: Alert. In no distress.  Head: Normocephalic. No masses, lesions, tenderness or abnormalities.  ENT: No neck nodes or sinus tenderness.  Cardiovascular: RRR. No murmurs, clicks, gallops, or rub.  Respiratory: Clear to auscultation bilaterally, no wheezes or  crackles.  Gastrointestinal: Abdomen soft. Non-tender. BS normal. No masses or organomegaly.  Musculoskeletal: Extremities normal. No gross deformities noted. Normal muscle tone.  Skin: No suspicious lesions. No rashes.  Neurologic: Gait normal. Reflexes normal and symmetric. Sensation grossly WNL.  Psychiatric: Mentation appears normal. Normal affect.   Hematologic/Lymphatic/Immunologic: Normal cervical lymph nodes.        Recent Labs:  CBC, BMP today.    EKG:  EKG was not indicated based on risk assessment.      Assessment and Plan:  Miki was seen today for pre-op exam.    Diagnoses and all orders for this visit:    Chronic low back pain without sciatica, unspecified back pain laterality  -     gabapentin (NEURONTIN) 300 MG capsule; One by mouth three times a day.    DIAGNOSIS NOT YET DEFINED  -     gabapentin (NEURONTIN) 300 MG capsule; One by mouth three times a day.    Chronic abdominal pain  -     gabapentin (NEURONTIN) 300 MG capsule; One by mouth three times a day.  -     pantoprazole (PROTONIX) 40 MG EC tablet; Take 1 tablet by mouth daily    Essential hypertension, benign  Controlled, current medications refilled, routine labs today.  -     gabapentin (NEURONTIN) 300 MG capsule; One by mouth three times a day.  -     metoprolol tartrate (LOPRESSOR) 50 MG tablet; Take 1 tablet (50 mg) by mouth 2 times daily  -     pantoprazole (PROTONIX) 40 MG EC tablet; Take 1 tablet by mouth daily  -     Lipid panel reflex to direct LDL Fasting; Future  -     Comprehensive metabolic panel; Future    Routine general medical examination at a health care facility  The patient is at LOW risk for cardiovascular complications and at LOW risk for pulmonary complications of this LOW risk surgery.    --Approval given to proceed with proposed procedure, without further diagnostic evaluation    The patient has been told to not take any aspirin or NSAIDs for 10 days prior to surgery. The patient has been instructed as to what to do  with medications prior to surgery.    Insomnia, unspecified type  -     mirtazapine (REMERON SOL-TAB) 15 MG ODT tab; Take 1 tablet sublingually at bedtime    Follow-up: in 1 year sooner as needed.         Scribe Disclosure:   I, Belén Craft, am serving as a scribe to document services personally performed by Peter Flower MD at this visit, based upon the provider's statements to me. All documentation has been reviewed by the aforementioned provider prior to being entered into the official medical record.     Portions of this medical record were completed by a scribe. UPON MY REVIEW AND AUTHENTICATION BY ELECTRONIC SIGNATURE, this confirms (a) I performed the applicable clinical services, and (b) the record is accurate.    Peter Flower MD

## 2018-03-05 RX ORDER — PROPARACAINE HYDROCHLORIDE 5 MG/ML
1 SOLUTION/ DROPS OPHTHALMIC ONCE
Status: CANCELLED | OUTPATIENT
Start: 2018-03-05 | End: 2018-03-05

## 2018-03-05 RX ORDER — CYCLOPENTOLATE HYDROCHLORIDE 10 MG/ML
1 SOLUTION/ DROPS OPHTHALMIC
Status: CANCELLED | OUTPATIENT
Start: 2018-03-05

## 2018-03-05 RX ORDER — PHENYLEPHRINE HYDROCHLORIDE 25 MG/ML
1 SOLUTION/ DROPS OPHTHALMIC
Status: CANCELLED | OUTPATIENT
Start: 2018-03-05

## 2018-03-05 RX ORDER — TROPICAMIDE 10 MG/ML
1 SOLUTION/ DROPS OPHTHALMIC
Status: CANCELLED | OUTPATIENT
Start: 2018-03-05

## 2018-03-05 NOTE — H&P (VIEW-ONLY)
[facility]   Peter Flower MD  02/23/2018     Chief Complaint:   Pre-op Exam      History of Present Illness:   Miki Busch is 72 year old male here at the request of Dr. Allen for cardiovascular, pulmonary, and perioperative risk assessment prior to surgery.  The intended surgical procedure is cataract removal, currently scheduled on 3/6/18 at [facility] for his right eye and on 3/20/18 at [facility] for his left eye.  A copy of this note will be sent to the surgeon.     Reason for surgery:  Mr. Busch reports he has opted for cataract excision for his bilateral cataracts as these are now interfering with his vision more.  He is planning to have both eyes done in the next month.      He was last seen about a year ago and generally has been doing fairly well.  He would like refills of his medications.  He is taking these as prescribed and has no side effects or other concerns with these.  His only new medication is eye drops for glaucoma.    Cardiovascular Risk:  This patient does not have chest pain with ambulation or walking up a flight of stairs.  There is not any chest pain with exercise.  He does not have a history of known cardiac disease.  There is not a history of stroke or valvular disease.    Pulmonary Risk:  In terms of risk factors for pulmonary complications, the patient does not have a history of lung problems.    Perioperative Complications:  The patient does not have a history of bleeding or clotting problems in the past. He has not had complications from past surgeries.  The patient does not have a family history of any anesthesia or surgical complications.     Review of Systems:   Pertinent items are noted in HPI and as below in patient entered answers.  All other systems are negative.  Answers for HPI/ROS submitted by the patient on 2/23/2018   General Symptoms: No  Skin Symptoms: No  HENT Symptoms: No  EYE SYMPTOMS: No  HEART SYMPTOMS: No  LUNG  SYMPTOMS: No  INTESTINAL SYMPTOMS: No  URINARY SYMPTOMS: No  REPRODUCTIVE SYMPTOMS: No  SKELETAL SYMPTOMS: No  BLOOD SYMPTOMS: No  NERVOUS SYSTEM SYMPTOMS: No  MENTAL HEALTH SYMPTOMS: No    Active Medications:      latanoprost (XALATAN) 0.005 % ophthalmic solution, Place 1 drop into both eyes At Bedtime, Disp: 1 Bottle, Rfl: 11     gabapentin (NEURONTIN) 300 MG capsule, One by mouth three times a day., Disp: 270 capsule, Rfl: 1     mirtazapine (REMERON SOL-TAB) 15 MG ODT tab, Take 1 tablet sublingually at bedtime, Disp: 90 tablet, Rfl: 3     metoprolol (LOPRESSOR) 50 MG tablet, Take 1 tablet (50 mg) by mouth 2 times daily, Disp: 180 tablet, Rfl: 3     pantoprazole (PROTONIX) 40 MG EC tablet, Take 1 tablet by mouth daily, Disp: 90 tablet, Rfl: 3     traZODone (DESYREL) 50 MG tablet, Take one half to one full tab at bedtime for sleep as needed, Disp: 90 tablet, Rfl: 3     ergocalciferol (DRISDOL) 8000 UNIT/ML drops, Take 1.25 mLs by mouth daily., Disp: 60 mL, Rfl:       Allergies:   Penicillins      Past Medical History:  Essential hypertension, benign  Cerebral infarction  Alcoholism  Chronic low back pain  Irritable bowel syndrome   Rectal abscess  Chronic abdominal pain      Past Surgical History:  Esophagogastroduodenoscopy 6/10/11  Incision and drainage     Family History:   Diabetes - father     Social History:   Marital Status:   Tobacco Use: Current daily smoker, 0.25 PPD.   Alcohol Use: history of alcohol abuse, now sober     Physical Exam:   /86 (BP Location: Right arm, Patient Position: Sitting, Cuff Size: Adult Regular)  Pulse 96  Resp 20  Wt 77.4 kg (170 lb 9.6 oz)  SpO2 93%  BMI 24.48 kg/m2     Constitutional: Alert. In no distress.  Head: Normocephalic. No masses, lesions, tenderness or abnormalities.  ENT: No neck nodes or sinus tenderness.  Cardiovascular: RRR. No murmurs, clicks, gallops, or rub.  Respiratory: Clear to auscultation bilaterally, no wheezes or  crackles.  Gastrointestinal: Abdomen soft. Non-tender. BS normal. No masses or organomegaly.  Musculoskeletal: Extremities normal. No gross deformities noted. Normal muscle tone.  Skin: No suspicious lesions. No rashes.  Neurologic: Gait normal. Reflexes normal and symmetric. Sensation grossly WNL.  Psychiatric: Mentation appears normal. Normal affect.   Hematologic/Lymphatic/Immunologic: Normal cervical lymph nodes.        Recent Labs:  CBC, BMP today.    EKG:  EKG was not indicated based on risk assessment.      Assessment and Plan:  Miki was seen today for pre-op exam.    Diagnoses and all orders for this visit:    Chronic low back pain without sciatica, unspecified back pain laterality  -     gabapentin (NEURONTIN) 300 MG capsule; One by mouth three times a day.    DIAGNOSIS NOT YET DEFINED  -     gabapentin (NEURONTIN) 300 MG capsule; One by mouth three times a day.    Chronic abdominal pain  -     gabapentin (NEURONTIN) 300 MG capsule; One by mouth three times a day.  -     pantoprazole (PROTONIX) 40 MG EC tablet; Take 1 tablet by mouth daily    Essential hypertension, benign  Controlled, current medications refilled, routine labs today.  -     gabapentin (NEURONTIN) 300 MG capsule; One by mouth three times a day.  -     metoprolol tartrate (LOPRESSOR) 50 MG tablet; Take 1 tablet (50 mg) by mouth 2 times daily  -     pantoprazole (PROTONIX) 40 MG EC tablet; Take 1 tablet by mouth daily  -     Lipid panel reflex to direct LDL Fasting; Future  -     Comprehensive metabolic panel; Future    Routine general medical examination at a health care facility  The patient is at LOW risk for cardiovascular complications and at LOW risk for pulmonary complications of this LOW risk surgery.    --Approval given to proceed with proposed procedure, without further diagnostic evaluation    The patient has been told to not take any aspirin or NSAIDs for 10 days prior to surgery. The patient has been instructed as to what to do  with medications prior to surgery.    Insomnia, unspecified type  -     mirtazapine (REMERON SOL-TAB) 15 MG ODT tab; Take 1 tablet sublingually at bedtime    Follow-up: in 1 year sooner as needed.         Scribe Disclosure:   I, Belén Craft, am serving as a scribe to document services personally performed by Peter Flower MD at this visit, based upon the provider's statements to me. All documentation has been reviewed by the aforementioned provider prior to being entered into the official medical record.     Portions of this medical record were completed by a scribe. UPON MY REVIEW AND AUTHENTICATION BY ELECTRONIC SIGNATURE, this confirms (a) I performed the applicable clinical services, and (b) the record is accurate.    Peter Flower MD

## 2018-03-06 ENCOUNTER — HOSPITAL ENCOUNTER (OUTPATIENT)
Facility: CLINIC | Age: 73
Discharge: HOME | End: 2018-03-06
Attending: OPHTHALMOLOGY | Admitting: OPHTHALMOLOGY
Payer: COMMERCIAL

## 2018-03-06 ENCOUNTER — ANESTHESIA EVENT (OUTPATIENT)
Dept: SURGERY | Facility: CLINIC | Age: 73
End: 2018-03-06
Payer: COMMERCIAL

## 2018-03-06 ENCOUNTER — SURGERY (OUTPATIENT)
Age: 73
End: 2018-03-06

## 2018-03-06 ENCOUNTER — APPOINTMENT (OUTPATIENT)
Dept: ADMISSION | Facility: CLINIC | Age: 73
End: 2018-03-06
Attending: OPHTHALMOLOGY

## 2018-03-06 ENCOUNTER — ANESTHESIA (OUTPATIENT)
Dept: SURGERY | Facility: CLINIC | Age: 73
End: 2018-03-06
Payer: COMMERCIAL

## 2018-03-06 VITALS
DIASTOLIC BLOOD PRESSURE: 87 MMHG | RESPIRATION RATE: 16 BRPM | TEMPERATURE: 98.1 F | OXYGEN SATURATION: 97 % | WEIGHT: 170.6 LBS | SYSTOLIC BLOOD PRESSURE: 134 MMHG | BODY MASS INDEX: 24.48 KG/M2

## 2018-03-06 DIAGNOSIS — H25.13 NUCLEAR SENILE CATARACT OF BOTH EYES: ICD-10-CM

## 2018-03-06 PROCEDURE — 36000101 ZZH EYE SURGERY LEVEL 3 1ST 30 MIN: Performed by: OPHTHALMOLOGY

## 2018-03-06 PROCEDURE — 37000008 ZZH ANESTHESIA TECHNICAL FEE, 1ST 30 MIN: Performed by: OPHTHALMOLOGY

## 2018-03-06 PROCEDURE — 25000128 H RX IP 250 OP 636: Performed by: OPHTHALMOLOGY

## 2018-03-06 PROCEDURE — 36000102 ZZH EYE SURGERY LEVEL 3 EA 15 ADDTL MIN: Performed by: OPHTHALMOLOGY

## 2018-03-06 PROCEDURE — 25000128 H RX IP 250 OP 636: Performed by: NURSE ANESTHETIST, CERTIFIED REGISTERED

## 2018-03-06 PROCEDURE — 25000125 ZZHC RX 250: Performed by: NURSE ANESTHETIST, CERTIFIED REGISTERED

## 2018-03-06 PROCEDURE — 40000170 ZZH STATISTIC PRE-PROCEDURE ASSESSMENT II: Performed by: OPHTHALMOLOGY

## 2018-03-06 PROCEDURE — 36000135 ZZH KERATOTOMY ARCUATE W FEMTOSECOND LASER/IMAGING FOR ATIOL: Performed by: OPHTHALMOLOGY

## 2018-03-06 PROCEDURE — 25000125 ZZHC RX 250: Performed by: OPHTHALMOLOGY

## 2018-03-06 PROCEDURE — 27210794 ZZH OR GENERAL SUPPLY STERILE: Performed by: OPHTHALMOLOGY

## 2018-03-06 PROCEDURE — 25000128 H RX IP 250 OP 636: Performed by: ANESTHESIOLOGY

## 2018-03-06 PROCEDURE — 25000125 ZZHC RX 250: Performed by: ANESTHESIOLOGY

## 2018-03-06 PROCEDURE — V2632 POST CHMBR INTRAOCULAR LENS: HCPCS | Performed by: OPHTHALMOLOGY

## 2018-03-06 PROCEDURE — 71000028 ZZH EYE RECOVERY PHASE 2 EACH 15 MINS: Performed by: OPHTHALMOLOGY

## 2018-03-06 PROCEDURE — 37000009 ZZH ANESTHESIA TECHNICAL FEE, EACH ADDTL 15 MIN: Performed by: OPHTHALMOLOGY

## 2018-03-06 DEVICE — IMPLANTABLE DEVICE: Type: IMPLANTABLE DEVICE | Site: EYE | Status: FUNCTIONAL

## 2018-03-06 RX ORDER — PROPARACAINE HYDROCHLORIDE 5 MG/ML
1 SOLUTION/ DROPS OPHTHALMIC ONCE
Status: DISCONTINUED | OUTPATIENT
Start: 2018-03-06 | End: 2018-03-07 | Stop reason: HOSPADM

## 2018-03-06 RX ORDER — ONDANSETRON 2 MG/ML
INJECTION INTRAMUSCULAR; INTRAVENOUS PRN
Status: DISCONTINUED | OUTPATIENT
Start: 2018-03-06 | End: 2018-03-06

## 2018-03-06 RX ORDER — BALANCED SALT SOLUTION 6.4; .75; .48; .3; 3.9; 1.7 MG/ML; MG/ML; MG/ML; MG/ML; MG/ML; MG/ML
SOLUTION OPHTHALMIC PRN
Status: DISCONTINUED | OUTPATIENT
Start: 2018-03-06 | End: 2018-03-06 | Stop reason: HOSPADM

## 2018-03-06 RX ORDER — PHENYLEPHRINE HYDROCHLORIDE 25 MG/ML
1 SOLUTION/ DROPS OPHTHALMIC
Status: COMPLETED | OUTPATIENT
Start: 2018-03-06 | End: 2018-03-06

## 2018-03-06 RX ORDER — PROPARACAINE HYDROCHLORIDE 5 MG/ML
1 SOLUTION/ DROPS OPHTHALMIC ONCE
Status: COMPLETED | OUTPATIENT
Start: 2018-03-06 | End: 2018-03-06

## 2018-03-06 RX ORDER — KETOROLAC TROMETHAMINE 5 MG/ML
1 SOLUTION OPHTHALMIC 4 TIMES DAILY
Qty: 1 BOTTLE | Refills: 0 | Status: SHIPPED | OUTPATIENT
Start: 2018-03-06 | End: 2019-01-01

## 2018-03-06 RX ORDER — OFLOXACIN 3 MG/ML
1 SOLUTION/ DROPS OPHTHALMIC 4 TIMES DAILY
Qty: 1 BOTTLE | Refills: 0 | Status: SHIPPED | OUTPATIENT
Start: 2018-03-06 | End: 2019-01-01

## 2018-03-06 RX ORDER — PREDNISOLONE ACETATE 10 MG/ML
1 SUSPENSION/ DROPS OPHTHALMIC 4 TIMES DAILY
Qty: 1 BOTTLE | Refills: 0 | Status: SHIPPED | OUTPATIENT
Start: 2018-03-06 | End: 2019-01-01

## 2018-03-06 RX ORDER — NEOMYCIN SULFATE, POLYMYXIN B SULFATE AND DEXAMETHASONE 3.5; 10000; 1 MG/ML; [USP'U]/ML; MG/ML
SUSPENSION/ DROPS OPHTHALMIC PRN
Status: DISCONTINUED | OUTPATIENT
Start: 2018-03-06 | End: 2018-03-06 | Stop reason: HOSPADM

## 2018-03-06 RX ORDER — CYCLOPENTOLATE HYDROCHLORIDE 10 MG/ML
1 SOLUTION/ DROPS OPHTHALMIC
Status: COMPLETED | OUTPATIENT
Start: 2018-03-06 | End: 2018-03-06

## 2018-03-06 RX ORDER — LIDOCAINE HYDROCHLORIDE 10 MG/ML
INJECTION, SOLUTION EPIDURAL; INFILTRATION; INTRACAUDAL; PERINEURAL PRN
Status: DISCONTINUED | OUTPATIENT
Start: 2018-03-06 | End: 2018-03-06 | Stop reason: HOSPADM

## 2018-03-06 RX ORDER — PROPARACAINE HYDROCHLORIDE 5 MG/ML
SOLUTION/ DROPS OPHTHALMIC PRN
Status: DISCONTINUED | OUTPATIENT
Start: 2018-03-06 | End: 2018-03-06 | Stop reason: HOSPADM

## 2018-03-06 RX ORDER — TROPICAMIDE 10 MG/ML
1 SOLUTION/ DROPS OPHTHALMIC
Status: COMPLETED | OUTPATIENT
Start: 2018-03-06 | End: 2018-03-06

## 2018-03-06 RX ORDER — SODIUM CHLORIDE, SODIUM LACTATE, POTASSIUM CHLORIDE, CALCIUM CHLORIDE 600; 310; 30; 20 MG/100ML; MG/100ML; MG/100ML; MG/100ML
500 INJECTION, SOLUTION INTRAVENOUS CONTINUOUS
Status: DISCONTINUED | OUTPATIENT
Start: 2018-03-06 | End: 2018-03-07 | Stop reason: HOSPADM

## 2018-03-06 RX ADMIN — SODIUM CHLORIDE, SODIUM LACTATE, POTASSIUM CHLORIDE, CALCIUM CHLORIDE: 600; 310; 30; 20 INJECTION, SOLUTION INTRAVENOUS at 13:29

## 2018-03-06 RX ADMIN — CYCLOPENTOLATE HYDROCHLORIDE 1 DROP: 10 SOLUTION/ DROPS OPHTHALMIC at 11:57

## 2018-03-06 RX ADMIN — BALANCED SALT SOLUTION 15 ML: 6.4; .75; .48; .3; 3.9; 1.7 SOLUTION OPHTHALMIC at 13:42

## 2018-03-06 RX ADMIN — PROPARACAINE HYDROCHLORIDE 1 DROP: 5 SOLUTION/ DROPS OPHTHALMIC at 11:58

## 2018-03-06 RX ADMIN — EPINEPHRINE 500 ML: 1 INJECTION, SOLUTION, CONCENTRATE INTRAVENOUS at 13:43

## 2018-03-06 RX ADMIN — LIDOCAINE HYDROCHLORIDE 1 ML: 10 INJECTION, SOLUTION EPIDURAL; INFILTRATION; INTRACAUDAL; PERINEURAL at 12:18

## 2018-03-06 RX ADMIN — PROPARACAINE HYDROCHLORIDE 1 DROP: 5 SOLUTION/ DROPS OPHTHALMIC at 13:23

## 2018-03-06 RX ADMIN — ONDANSETRON 4 MG: 2 INJECTION INTRAMUSCULAR; INTRAVENOUS at 13:44

## 2018-03-06 RX ADMIN — PHENYLEPHRINE HYDROCHLORIDE 1 DROP: 2.5 SOLUTION/ DROPS OPHTHALMIC at 12:09

## 2018-03-06 RX ADMIN — MIDAZOLAM 0.5 MG: 1 INJECTION INTRAMUSCULAR; INTRAVENOUS at 13:35

## 2018-03-06 RX ADMIN — LIDOCAINE HYDROCHLORIDE 0.5 ML: 35 GEL OPHTHALMIC at 13:43

## 2018-03-06 RX ADMIN — DEXMEDETOMIDINE HYDROCHLORIDE 8 MCG: 100 INJECTION, SOLUTION INTRAVENOUS at 13:32

## 2018-03-06 RX ADMIN — TROPICAMIDE 1 DROP: 10 SOLUTION/ DROPS OPHTHALMIC at 12:10

## 2018-03-06 RX ADMIN — NEOMYCIN SULFATE, POLYMYXIN B SULFATE AND DEXAMETHASONE 1 DROP: 3.5; 10000; 1 SUSPENSION OPHTHALMIC at 14:06

## 2018-03-06 RX ADMIN — TROPICAMIDE 1 DROP: 10 SOLUTION/ DROPS OPHTHALMIC at 12:02

## 2018-03-06 RX ADMIN — TROPICAMIDE 1 DROP: 10 SOLUTION/ DROPS OPHTHALMIC at 11:57

## 2018-03-06 RX ADMIN — CYCLOPENTOLATE HYDROCHLORIDE 1 DROP: 10 SOLUTION/ DROPS OPHTHALMIC at 12:10

## 2018-03-06 RX ADMIN — CYCLOPENTOLATE HYDROCHLORIDE 1 DROP: 10 SOLUTION/ DROPS OPHTHALMIC at 12:03

## 2018-03-06 RX ADMIN — MIDAZOLAM 0.5 MG: 1 INJECTION INTRAMUSCULAR; INTRAVENOUS at 13:33

## 2018-03-06 RX ADMIN — PHENYLEPHRINE HYDROCHLORIDE 1 DROP: 2.5 SOLUTION/ DROPS OPHTHALMIC at 11:57

## 2018-03-06 RX ADMIN — SODIUM CHONDROITIN SULFATE / SODIUM HYALURONATE 1 ML: 0.55-0.5 INJECTION INTRAOCULAR at 13:44

## 2018-03-06 RX ADMIN — PHENYLEPHRINE HYDROCHLORIDE 1 DROP: 2.5 SOLUTION/ DROPS OPHTHALMIC at 12:02

## 2018-03-06 RX ADMIN — LIDOCAINE HYDROCHLORIDE 1 ML: 10 INJECTION, SOLUTION EPIDURAL; INFILTRATION; INTRACAUDAL; PERINEURAL at 13:43

## 2018-03-06 RX ADMIN — BALANCED SALT SOLUTION 15 ML: 6.4; .75; .48; .3; 3.9; 1.7 SOLUTION OPHTHALMIC at 13:23

## 2018-03-06 RX ADMIN — MIDAZOLAM 1 MG: 1 INJECTION INTRAMUSCULAR; INTRAVENOUS at 13:30

## 2018-03-06 NOTE — ANESTHESIA CARE TRANSFER NOTE
Patient: Miki Busch    Procedure(s):  RIGHT EYE FEMTOSECOND LASER ASSISTED PHACOEMULSIFICATION CLEAR CORNEA WITH STANDARD INTRAOCULAR LENS IMPLANT - Wound Class: I-Clean    Diagnosis: RIGHT EYE CATARACT  Diagnosis Additional Information: No value filed.    Anesthesia Type:   MAC     Note:  Airway :Room Air  Patient transferred to:PACU  Comments: Pt exhibits spont resps, all monitors and alarms on in pacu, report given to RN, vss.Handoff Report: Identifed the Patient, Identified the Reponsible Provider, Reviewed the pertinent medical history, Discussed the surgical course, Reviewed Intra-OP anesthesia mangement and issues during anesthesia, Set expectations for post-procedure period and Allowed opportunity for questions and acknowledgement of understanding      Vitals: (Last set prior to Anesthesia Care Transfer)    CRNA VITALS  3/6/2018 1333 - 3/6/2018 1408      3/6/2018             Pulse: 73    Ht Rate: 72    SpO2: 99 %    Resp Rate (set): 10                Electronically Signed By: JUAN Keith CRNA  March 6, 2018  2:08 PM

## 2018-03-06 NOTE — ANESTHESIA PREPROCEDURE EVALUATION
Anesthesia Evaluation     . Pt has had prior anesthetic.     No history of anesthetic complications          ROS/MED HX    ENT/Pulmonary:      (-) sleep apnea   Neurologic: Comment: Left sided weakness 2005, insomnia, CLBP    (+)CVA     Cardiovascular:     (+) Dyslipidemia, hypertension----. : . . . :. .      (-) TRACEY   METS/Exercise Tolerance:  >4 METS   Hematologic:         Musculoskeletal:         GI/Hepatic: Comment: abd pain, h/o EtOH abuse none since 2005       (-) GERD   Renal/Genitourinary:         Endo:         Psychiatric:         Infectious Disease:         Malignancy:         Other:                     Physical Exam      Airway   Mallampati: II  TM distance: >3 FB  Neck ROM: full    Dental   (+) missing, chipped and loose    Cardiovascular   Rhythm and rate: regular      Pulmonary    breath sounds clear to auscultation                    Anesthesia Plan      History & Physical Review  History and physical reviewed and following examination; no interval change.    ASA Status:  3 .        Plan for MAC          Postoperative Care      Consents  Anesthetic plan, risks, benefits and alternatives discussed with:  Patient..                          .  Procedure: Procedure(s):  PHACOEMULSIFICATION CLEAR CORNEA WITH STANDARD INTRAOCULAR LENS IMPLANT  Preop diagnosis: RIGHT EYE CATARACT    Allergies   Allergen Reactions     Penicillins      Past Medical History:   Diagnosis Date     Alcoholism (H)      Chronic low back pain      CVA (cerebral infarction)      Hypertension      Irritable bowel syndrome      Left-sided muscle weakness      Rectal abscess Oct 2010     Past Surgical History:   Procedure Laterality Date     COLONOSCOPY       ESOPHAGOSCOPY, GASTROSCOPY, DUODENOSCOPY (EGD), COMBINED  6/10/2011    Procedure:COMBINED ESOPHAGOSCOPY, GASTROSCOPY, DUODENOSCOPY (EGD); Surgeon:CAROLA MORENO; Location:[facility]     INCISION AND DRAINAGE, ABSCESS, SIMPLE       ORTHOPEDIC SURGERY      SHOULDER REPLACEMENT      POLYPS REMOVAL FROM COLON       Social History   Substance Use Topics     Smoking status: Current Every Day Smoker     Packs/day: 0.25     Types: Cigarettes     Smokeless tobacco: Never Used     Alcohol use No     Prior to Admission medications    Medication Sig Start Date End Date Taking? Authorizing Provider   gabapentin (NEURONTIN) 300 MG capsule One by mouth three times a day. 2/23/18  Yes Peter Flower MD   mirtazapine (REMERON SOL-TAB) 15 MG ODT tab Take 1 tablet sublingually at bedtime 2/23/18  Yes Peter Flower MD   metoprolol tartrate (LOPRESSOR) 50 MG tablet Take 1 tablet (50 mg) by mouth 2 times daily 2/23/18  Yes Peter Flower MD   pantoprazole (PROTONIX) 40 MG EC tablet Take 1 tablet by mouth daily 2/23/18  Yes Peter Flower MD   latanoprost (XALATAN) 0.005 % ophthalmic solution Place 1 drop into both eyes At Bedtime 2/9/18  Yes Paulie Gtz,    traZODone (DESYREL) 50 MG tablet Take one half to one full tab at bedtime for sleep as needed 4/19/13  Yes Peter Flower MD     Current Facility-Administered Medications Ordered in Epic   Medication Dose Route Frequency Last Rate Last Dose     lidocaine (AKTEN) ophthalmic gel 0.5 mL  0.5 mL Ophthalmic Once         proparacaine (ALCAINE) 0.5 % ophthalmic solution 1 drop  1 drop Ophthalmic Once         lidocaine 1 % 1 mL  1 mL Other Q1H PRN   1 mL at 03/06/18 1218     lactated ringers infusion  500 mL Intravenous Continuous         sodium chloride (PF) 0.9% PF flush 3 mL  3 mL Intracatheter Q1H PRN         sodium chloride (PF) 0.9% PF flush 3 mL  3 mL Intracatheter Q8H   3 mL at 03/06/18 1219     No current [facility]-ordered outpatient prescriptions on file.       lactated ringers       Wt Readings from Last 1 Encounters:   03/06/18 77.4 kg (170 lb 9.6 oz)     Temp Readings from Last 1 Encounters:   03/06/18 36.7  C (98.1  F) (Temporal)     BP Readings from Last 6 Encounters:   03/06/18 (!) 148/95   02/23/18 124/86   04/27/17  133/89   03/09/16 139/87   01/27/15 136/83   04/19/13 174/85     Pulse Readings from Last 4 Encounters:   02/23/18 96   04/27/17 84   03/09/16 63   01/27/15 64     Resp Readings from Last 1 Encounters:   03/06/18 16     SpO2 Readings from Last 1 Encounters:   03/06/18 96%     Recent Labs   Lab Test  02/23/18   1614  04/27/17   1718   NA  137  138   POTASSIUM  4.2  4.2   CHLORIDE  104  101   CO2  28  32   ANIONGAP  6  4   GLC  135*  79   BUN  18  8   CR  0.89  0.72   JAM  8.4*  8.6     Recent Labs   Lab Test  02/23/18   1614  04/27/17   1718   06/12/11   0724   04/21/10   1605   AST  12  10   < >  216*   < >  28   ALT  14  12   < >  129*   < >  <6   ALKPHOS  100  94   < >  561*   < >  134   BILITOTAL  0.4  0.4   < >  3.0*   < >  <0.1*   LIPASE   --    --    --   71   --   132    < > = values in this interval not displayed.     Recent Labs   Lab Test  07/05/11   1212  07/01/11   2215   WBC  3.4*  7.1   HGB  9.2*  11.0*   PLT  206  229     Recent Labs   Lab Test  04/21/10   2247   ABO  A   RH   Pos     Recent Labs   Lab Test  06/30/11   0600  06/15/11   0137   04/21/10   1605   INR  1.19*  1.15*   < >  1.81*   PTT   --    --    --   41*    < > = values in this interval not displayed.      Recent Labs   Lab Test  07/02/11   0121  07/01/11   2045  07/01/11   1550   TROPI  0.017  0.015  0.012     Recent Labs   Lab Test  06/13/11   1010   PH  7.44   PCO2  39   PO2  117*   HCO3  26     No results for input(s): HCG in the last 60108 hours.  No results found for this or any previous visit (from the past 744 hour(s)).    RECENT LABS:   ECG:   ECHO:

## 2018-03-06 NOTE — DISCHARGE INSTRUCTIONS
[facility] Discharge  Instructions  Anesthesia (Eye Care Center)   Adult Discharge Instructions    For 24 hours after surgery    1. Get plenty of rest.  Make arrangements to have a responsible adult stay with you for at least 6 hours after you leave the hospital.  2. Do not drive or use heavy equipment for 24 hours.    3. Do not drink alcohol for 24 hours.  4. Do not sign legal documents or make important decisions for 24 hours.  5. Avoid strenuous or risky activities. You may feel lightheaded.  If so, sit for a few minutes before standing.  Have someone help you get up.   6. Conscious sedation patients may resume a regular diet..  7. Any questions of medical nature, call your physician.    Dr. Lionel Barrios  [facility]  [phone]  Post Operative Cataract Instructions              If you only have a clear eye shield on, you may remove the eye shield on arrival home and begin eye drops today as directed by Dr. Barrios.      Wear the clear eye shield when sleeping for protection for 5 days.      Do not rub the operated eye.      Light sensitivity may be noticed. Sunglasses may be worn for comfort.      Some discomfort and irritation may be noticed. Acetaminophen (Tylenol) or Ibuprofen (Advil) may be taken for discomfort. If pain persists please call Dr. Barrios's office.      Keep the operated eye dry. You may wash your hair, bathe or shower, but keep the operated eye closed while doing so.       If you take glaucoma medications, bring them with you to the clinic on your first post operative visit.      Bring your prescribed eye drops with you to your scheduled post-operative appointment.      Use medication exactly as prescribed by your doctor. You may restart your regular home medications.       Call Dr. Barrios's office at [phone] if any of the following should occur:    - Any sudden vision changes, including decreased vision  - Nausea or severe headache  - Increase in pain  not controlled  - Signs of infection (pus, increasing redness or tenderness)  - Severe sensitivity to light

## 2018-03-06 NOTE — PROGRESS NOTES
Pt ok for DC but waiting for a ride which will not be here until 1630.  Will go over DC papers when ride arrives.  Son in law has been notified and aware pt is done with is procedure.

## 2018-03-06 NOTE — IP AVS SNAPSHOT
[facility]    [address]    [place] [address]    Phone:  [phone]    Fax:  [phone]                                       After Visit Summary   3/6/2018    Miki Busch    MRN: [id number]           After Visit Summary Signature Page     I have received my discharge instructions, and my questions have been answered. I have discussed any challenges I see with this plan with the nurse or doctor.    ..........................................................................................................................................  Patient/Patient Representative Signature      ..........................................................................................................................................  Patient Representative Print Name and Relationship to Patient    ..................................................               ................................................  Date                                            Time    ..........................................................................................................................................  Reviewed by Signature/Title    ...................................................              ..............................................  Date                                                            Time

## 2018-03-06 NOTE — OP NOTE
Date of Operation: 03/06/2018     Pre-operative diagnosis: Visually significant cataract, right eye   Post-operative diagnosis: Same   Procedure(s): Femtosecond laser assisted cataract extraction with placement of intraocular lens, right eye    Surgeon: Dr. Paulie Gtz  Attending: Dr. Lionel Barrios    Findings: None   Blood Loss: None  Complications: None     Implant: [id number] +15.5D, right eye    INDICATION FOR PROCEDURE   The patient has been followed in our eye clinic with a visually-significant cataract that has been affecting their activities of daily living. The risks, including, but not limited to infection, loss of vision, loss of eye, need for more surgery, and bleeding, along with the benefits, alternatives, expectations, and the procedure itself were discussed at length with the patient who wished to proceed with surgery.     DESCRIPTION OF PROCEDURE   In the preoperative suite, the patient was identified, the surgical site marked and informed consent was obtained. The patient was brought to the [employer] laser suite where topical sterile proparacaine was placed in the surgical eye. The patient was then docked to the fluid patient interface. The Catalys laser was then used to performed capsular incision, and lens fragmention. Following use of the Catal[employer] laser platform the patient was the brought back to the operative suite where the appropriate anesthesia monitors were connected. Akten gel was applied to the surgical eye to provide anesthesia. A routine time-out was performed and the patient's right eye was then prepped and draped in the usual sterile fashion for ophthalmic surgery.  Following draping, a lid speculum was placed to the operative eye. Using a sideport blade, a paracentesis was created with the assistance of a 0.12 forceps. Preservative free lidocaine was injected to provide further anesthesia. Viscoat was then injected into the anterior chamber with caution to place the viscoelastic above the  previously made capsulorrhexis. The main keratome wound was created using a pair of 0.12 forceps and a 2.4mm keratotome blade. A cystitome used to gently test the capsulorrhexis and ensure no areas of attachment prior to complete removal of the capsulorrhexis. Utrata forceps were then used to remove the anterior capsule. Viscoelastic was then used to gently crack the nucleus, followed by hydrodissection. The cataract was then removed using a horizontal chop technique with a second instrument as needed with minimal phacoemulsification. The irrigation and aspiration handpiece was used to remove the remaining cortex. The capsular bag was filled with viscoelastic and the intraocular lens was injected into the capsular bag and dialed into position. The remaining viscoelastic was removed using irrigation and aspiration. BSS on a cannula was used to hydrate the clear corneal and paracentesis incisions. Weck-maria luisa sponges were used to confirm there were no leaks from the incisions. Intraocular pressure was assessed and found to be appropriate. Following the end of the case, the anterior chamber was noted to be deep and the lens was found to be well-centered and in the capsular bag.   The lid speculum and drapes were carefully removed and antibiotic and steroid drops, of vigamox and prednisolone, were placed to the operative eye. An eye shield was taped over the eye. The patient was then taken to the recovery room in stable condition having tolerated the procedure well and discharged home in good condition. Patient is to follow-up next day at eye clinic for post-operative visit.  Dr. Lionel Barrios was scrubbed and present for the entire surgery.

## 2018-03-06 NOTE — OR NURSING
Discharge instructions reviewed with pt's son in law Will. Pt has 3 bottles of drops - instructed to begin today for 2 rounds. (when arriving home and at bedtime).

## 2018-03-06 NOTE — IP AVS SNAPSHOT
MRN:[id number]                      After Visit Summary   3/6/2018    Miki Busch    MRN: [id number]           Thank you!     Thank you for choosing [facility] for your care. Our goal is always to provide you with excellent care. Hearing back from our patients is one way we can continue to improve our services. Please take a few minutes to complete the written survey that you may receive in the mail after you visit with us. Thank you!        Patient Information     Date Of Birth          1945        About your hospital stay     You were admitted on:  March 6, 2018 You last received care in the:  [facility]    You were discharged on:  March 6, 2018       Who to Call     For medical emergencies, please call 911.  For non-urgent questions about your medical care, please call your primary care provider or clinic, [phone]  For questions related to your surgery, please call your surgery clinic        Attending Provider     Provider Specialty    Lionel Barrios MD Ophthalmology       Primary Care Provider Office Phone # Fax #    Peter Flower [phone]      Your next 10 appointments already scheduled     Mar 07, 2018  3:00 PM CST   Post-Op with Lionel Barrios MD   Eye Clinic ([facility])    [address] [address]   [phone]            Mar 16, 2018  2:15 PM CDT   Post-Op with Lionel Barrios MD   Eye Clinic ([facility])    [address] [address]   [phone]            Mar 21, 2018  3:00 PM CDT   Post-Op with Lionel Barrios MD   Eye Clinic ([facility])    [address] [address]   [phone]            Mar 28, 2018  3:00 PM CDT   Post-Op with Lionel Barrios MD   Eye Clinic ([facility])     [address]  [address] [address]  [facility] [address]   [phone]            Apr 18, 2018  3:00 PM CDT   Post-Op with Lionel Barrios MD   Eye Clinic ([facility])    [address]  [address] [address]  [facility] [address]   [phone]              Further instructions from your care team       [facility] Discharge  Instructions  Anesthesia (Eye Care Center)   Adult Discharge Instructions    For 24 hours after surgery    1. Get plenty of rest.  Make arrangements to have a responsible adult stay with you for at least 6 hours after you leave the hospital.  2. Do not drive or use heavy equipment for 24 hours.    3. Do not drink alcohol for 24 hours.  4. Do not sign legal documents or make important decisions for 24 hours.  5. Avoid strenuous or risky activities. You may feel lightheaded.  If so, sit for a few minutes before standing.  Have someone help you get up.   6. Conscious sedation patients may resume a regular diet..  7. Any questions of medical nature, call your physician.    Dr. Lionel Barrios  [facility]  [phone]  Post Operative Cataract Instructions              If you only have a clear eye shield on, you may remove the eye shield on arrival home and begin eye drops today as directed by Dr. Barrios.      Wear the clear eye shield when sleeping for protection for 5 days.      Do not rub the operated eye.      Light sensitivity may be noticed. Sunglasses may be worn for comfort.      Some discomfort and irritation may be noticed. Acetaminophen (Tylenol) or Ibuprofen (Advil) may be taken for discomfort. If pain persists please call Dr. Barrios's office.      Keep the operated eye dry. You may wash your hair, bathe or shower, but keep the operated eye closed while doing so.       If you take glaucoma medications, bring them with you to the clinic on your first post operative  visit.      Bring your prescribed eye drops with you to your scheduled post-operative appointment.      Use medication exactly as prescribed by your doctor. You may restart your regular home medications.       Call Dr. Barrios's office at [phone] if any of the following should occur:    - Any sudden vision changes, including decreased vision  - Nausea or severe headache  - Increase in pain not controlled  - Signs of infection (pus, increasing redness or tenderness)  - Severe sensitivity to light    Pending Results     No orders found from 3/4/2018 to 3/7/2018.            Admission Information     Date & Time Provider Department Dept. Phone    3/6/2018 Lionel Barrios MD [facility] [phone]      Your Vitals Were     Blood Pressure Temperature Respirations Weight Pulse Oximetry BMI (Body Mass Index)    134/87 98.1  F (36.7  C) (Temporal) 16 77.4 kg (170 lb 9.6 oz) 97% 24.48 kg/m2      MyChart Information     [employer] gives you secure access to your electronic health record. If you see a primary care provider, you can also send messages to your care team and make appointments. If you have questions, please call your primary care clinic.  If you do not have a primary care provider, please call [phone] and they will assist you.        Care EveryWhere ID     This is your Care EveryWhere ID. This could be used by other organizations to access your [facility] medical records  [id number]        Equal Access to Services     JUAN R ARROYO : Hadii gus Servin, dewayne barber, qaybta lalo anglin. So [facility] [phone].    ATENCIÓN: Si habla español, tiene a shelby disposición servicios gratuitos de asistencia lingüística. Carmen al [phone].    We comply with applicable federal civil rights laws and [place] laws. We do not discriminate on the basis of race, color, national origin, age, disability, sex, sexual orientation, or gender  identity.               Review of your medicines      UNREVIEWED medicines. Ask your doctor about these medicines        Dose / Directions    gabapentin 300 MG capsule   Commonly known as:  NEURONTIN   Used for:  Chronic low back pain without sciatica, unspecified back pain laterality, DIAGNOSIS NOT YET DEFINED, Chronic abdominal pain, Essential hypertension, benign, Routine general medical examination at a health care facility        One by mouth three times a day.   Quantity:  270 capsule   Refills:  3       latanoprost 0.005 % ophthalmic solution   Commonly known as:  XALATAN   Used for:  Open angle glaucoma cupping of optic discs, bilateral        Dose:  1 drop   Place 1 drop into both eyes At Bedtime   Quantity:  1 Bottle   Refills:  11       metoprolol tartrate 50 MG tablet   Commonly known as:  LOPRESSOR   Used for:  Essential hypertension, benign        Dose:  50 mg   Take 1 tablet (50 mg) by mouth 2 times daily   Quantity:  180 tablet   Refills:  3       mirtazapine 15 MG ODT tab   Commonly known as:  REMERON SOL-TAB   Used for:  Insomnia, unspecified type        Take 1 tablet sublingually at bedtime   Quantity:  90 tablet   Refills:  3       pantoprazole 40 MG EC tablet   Commonly known as:  PROTONIX   Used for:  Chronic abdominal pain, Essential hypertension, benign        Take 1 tablet by mouth daily   Quantity:  90 tablet   Refills:  3       traZODone 50 MG tablet   Commonly known as:  DESYREL   Used for:  Insomnia, unspecified, DIAGNOSIS NOT YET DEFINED, Chronic low back pain, Chronic abdominal pain, Essential hypertension, benign, Routine general medical examination at a health care facility        Take one half to one full tab at bedtime for sleep as needed   Quantity:  90 tablet   Refills:  3         START taking        Dose / Directions    ketorolac 0.5 % ophthalmic solution   Commonly known as:  ACULAR   Used for:  Nuclear senile cataract of both eyes        Dose:  1 drop   Place 1 drop into the  right eye 4 times daily   Quantity:  1 Bottle   Refills:  0       ofloxacin 0.3 % ophthalmic solution   Commonly known as:  OCUFLOX   Used for:  Nuclear senile cataract of both eyes        Dose:  1 drop   Place 1 drop into the right eye 4 times daily   Quantity:  1 Bottle   Refills:  0       prednisoLONE acetate 1 % ophthalmic susp   Commonly known as:  PRED FORTE   Used for:  Nuclear senile cataract of both eyes        Dose:  1 drop   Place 1 drop into the right eye 4 times daily   Quantity:  1 Bottle   Refills:  0            Where to get your medicines      These medications were sent to [facility] [place], [place] - [address]  [address], [place] [address]     Phone:  [phone]     ketorolac 0.5 % ophthalmic solution    ofloxacin 0.3 % ophthalmic solution    prednisoLONE acetate 1 % ophthalmic susp                Protect others around you: Learn how to safely use, store and throw away your medicines at www.disposemymeds.org.             Medication List: This is a list of all your medications and when to take them. Check marks below indicate your daily home schedule. Keep this list as a reference.      Medications           Morning Afternoon Evening Bedtime As Needed    gabapentin 300 MG capsule   Commonly known as:  NEURONTIN   One by mouth three times a day.                                ketorolac 0.5 % ophthalmic solution   Commonly known as:  ACULAR   Place 1 drop into the right eye 4 times daily                                latanoprost 0.005 % ophthalmic solution   Commonly known as:  XALATAN   Place 1 drop into both eyes At Bedtime                                metoprolol tartrate 50 MG tablet   Commonly known as:  LOPRESSOR   Take 1 tablet (50 mg) by mouth 2 times daily                                mirtazapine 15 MG ODT tab   Commonly known as:  REMERON SOL-TAB   Take 1 tablet sublingually at bedtime                                ofloxacin 0.3 % ophthalmic solution    Commonly known as:  OCUFLOX   Place 1 drop into the right eye 4 times daily                                pantoprazole 40 MG EC tablet   Commonly known as:  PROTONIX   Take 1 tablet by mouth daily                                prednisoLONE acetate 1 % ophthalmic susp   Commonly known as:  PRED FORTE   Place 1 drop into the right eye 4 times daily                                traZODone 50 MG tablet   Commonly known as:  DESYREL   Take one half to one full tab at bedtime for sleep as needed

## 2018-03-07 ENCOUNTER — OFFICE VISIT (OUTPATIENT)
Dept: OPHTHALMOLOGY | Facility: CLINIC | Age: 73
End: 2018-03-07
Attending: OPHTHALMOLOGY
Payer: COMMERCIAL

## 2018-03-07 DIAGNOSIS — Z96.1 PSEUDOPHAKIA: Primary | ICD-10-CM

## 2018-03-07 DIAGNOSIS — H25.12 AGE-RELATED NUCLEAR CATARACT OF LEFT EYE: ICD-10-CM

## 2018-03-07 DIAGNOSIS — H35.3190 NONEXUDATIVE SENILE MACULAR DEGENERATION OF RETINA: ICD-10-CM

## 2018-03-07 DIAGNOSIS — H40.013 OPEN ANGLE GLAUCOMA CUPPING OF OPTIC DISCS, BILATERAL: ICD-10-CM

## 2018-03-07 PROCEDURE — G0463 HOSPITAL OUTPT CLINIC VISIT: HCPCS | Mod: ZF

## 2018-03-07 ASSESSMENT — EXTERNAL EXAM - RIGHT EYE: OD_EXAM: NORMAL

## 2018-03-07 ASSESSMENT — SLIT LAMP EXAM - LIDS
COMMENTS: NORMAL
COMMENTS: NORMAL

## 2018-03-07 ASSESSMENT — VISUAL ACUITY
OS_CC: 20/40
OS_CC+: -2
OD_SC: 20/40
OD_PH_SC: 20/30
METHOD: SNELLEN - LINEAR
CORRECTION_TYPE: GLASSES

## 2018-03-07 ASSESSMENT — REFRACTION_WEARINGRX
SPECS_TYPE: SVL
OS_AXIS: 105
OS_SPHERE: -2.50
OD_CYLINDER: +1.25
OD_SPHERE: -7.50
OS_CYLINDER: +0.25
OD_AXIS: 010

## 2018-03-07 ASSESSMENT — TONOMETRY
IOP_METHOD: ICARE
OS_IOP_MMHG: 21
OD_IOP_MMHG: 25

## 2018-03-07 ASSESSMENT — EXTERNAL EXAM - LEFT EYE: OS_EXAM: NORMAL

## 2018-03-07 NOTE — NURSING NOTE
Chief Complaints and History of Present Illnesses   Patient presents with     Post Op (Ophthalmology) Right Eye     s/p 1 day P/O RE IOL     HPI    Affected eye(s):  Right   Symptoms:     No blurred vision   No decreased vision   No floaters   No flashes      Duration:  1 day   Frequency:  Constant       Do you have eye pain now?:  No      Comments:  Pt. stated vision has improved over the last 1 day RE.  Drops started  Leobardo Griffin  3:25 PM March 7, 2018

## 2018-03-07 NOTE — ANESTHESIA POSTPROCEDURE EVALUATION
Patient: Miki Busch    Procedure(s):  RIGHT EYE FEMTOSECOND LASER ASSISTED PHACOEMULSIFICATION CLEAR CORNEA WITH STANDARD INTRAOCULAR LENS IMPLANT - Wound Class: I-Clean    Diagnosis:RIGHT EYE CATARACT  Diagnosis Additional Information: No value filed.    Anesthesia Type:  MAC    Note:  Anesthesia Post Evaluation    Patient location during evaluation: PACU  Patient participation: Able to fully participate in evaluation  Level of consciousness: awake  Pain management: adequate  Airway patency: patent  Cardiovascular status: acceptable  Respiratory status: acceptable  Hydration status: acceptable  PONV: controlled     Anesthetic complications: None          Last vitals:  Vitals:    03/06/18 1205 03/06/18 1405 03/06/18 1414   BP: (!) 148/95 129/82 134/87   Resp: 16 16 16   Temp: 36.7  C (98.1  F)     SpO2: 96% 97% 97%         Electronically Signed By: Elke Healy MD  March 6, 2018  6:00 PM

## 2018-03-07 NOTE — MR AVS SNAPSHOT
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, bilateral        Nonexudative senile macular degeneration of retina           Follow-ups after your visit        Your next 10 appointments already scheduled     Mar 16, 2018  2:15 PM CDT   Post-Op with Lionel Barrios MD   Eye Clinic ([facility])    [facility]  [address]  [facility] [address]  [facility] [address]   [phone]            Mar 21, 2018  3:00 PM CDT   Post-Op with Lionel Barrios MD   Eye Clinic ([facility])    [facility]  [address]  [facility] [address] [place] [address]   [phone]            Mar 28, 2018  3:00 PM CDT   Post-Op with Lionel Bariros MD   Eye Clinic ([facility])    [facility]  [address]   [address] [address]  [facility] [address]   [phone]            Apr 18, 2018  3:00 PM CDT   Post-Op with Lionel Barrios MD   Eye Clinic ([facility])    [facility]  [address] [place] [address]  [facility] [address] [place] [address]   [phone]              Who to contact     Please call your clinic at [phone] to:    Ask questions about your health    Make or cancel appointments    Discuss your medicines    Learn about your test results    Speak to your doctor            Additional Information About Your Visit        MyChart Information     Adwantedt gives you secure access to your electronic health record. If you see a primary care provider, you can also send messages to your care team and make appointments. If you have questions, please call  your primary care clinic.  If you do not have a primary care provider, please call [phone] and they will assist you.      ByeCity is an electronic gateway that provides easy, online access to your medical records. With ByeCity, you can request a clinic appointment, read your test results, renew a prescription or communicate with your care team.     To access your existing account, please contact your [facility] or call [phone] for assistance.        Care EveryWhere ID     This is your Care EveryWhere ID. This could be used by other organizations to access your [facility] medical records  [id number]         Blood Pressure from Last 3 Encounters:   03/06/18 134/87   02/23/18 124/86   04/27/17 133/89    Weight from Last 3 Encounters:   03/06/18 77.4 kg (170 lb 9.6 oz)   02/23/18 77.4 kg (170 lb 9.6 oz)   03/09/16 80.6 kg (177 lb 11.2 oz)              Today, you had the following     No orders found for display       Primary Care Provider Office Phone # Fax #    Peter Flower [phone]       [address] [address]        Equal Access to Services     JUAN R ARROYO : Hadii aad ku hadasho Somelissaali, waaxda luqadaha, qaybta kaalmada adeegyada, lalo guthrie. So [facility] [phone].    ATENCIÓN: Si habla español, tiene a shelby disposición servicios gratuitos de asistencia lingüística. Llame al [phone].    We comply with applicable federal civil rights laws and [place] laws. We do not discriminate on the basis of race, color, national origin, age, disability, sex, sexual orientation, or gender identity.            Thank you!     Thank you for choosing EYE CLINIC  for your care. Our goal is always to provide you with excellent care. Hearing back from our patients is one way we can continue to improve our services. Please take a few minutes to complete the written survey that you may receive in the mail after your  visit with us. Thank you!             Your Updated Medication List - Protect others around you: Learn how to safely use, store and throw away your medicines at www.disposemymeds.org.          This list is accurate as of 3/7/18 11:59 PM.  Always use your most recent med list.                   Brand Name Dispense Instructions for use Diagnosis    gabapentin 300 MG capsule    NEURONTIN    270 capsule    One by mouth three times a day.    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, bilateral       metoprolol tartrate 50 MG tablet    LOPRESSOR    180 tablet    Take 1 tablet (50 mg) by mouth 2 times daily    Essential hypertension, benign       mirtazapine 15 MG ODT tab    REMERON SOL-TAB    90 tablet    Take 1 tablet sublingually at bedtime    Insomnia, unspecified type       ofloxacin 0.3 % ophthalmic solution    OCUFLOX    1 Bottle    Place 1 drop into the right eye 4 times daily    Nuclear senile cataract of both eyes       pantoprazole 40 MG EC tablet    PROTONIX    90 tablet    Take 1 tablet by mouth daily    Chronic abdominal pain, Essential hypertension, benign       prednisoLONE acetate 1 % ophthalmic susp    PRED FORTE    1 Bottle    Place 1 drop into the right eye 4 times daily    Nuclear senile cataract of both eyes       traZODone 50 MG tablet    DESYREL    90 tablet    Take one half to one full tab at bedtime for sleep as needed    Insomnia, unspecified, DIAGNOSIS NOT YET DEFINED, Chronic low back pain, Chronic abdominal pain,  Essential hypertension, benign, Routine general medical examination at a health care facility

## 2018-03-16 ENCOUNTER — TELEPHONE (OUTPATIENT)
Dept: OPHTHALMOLOGY | Facility: CLINIC | Age: 73
End: 2018-03-16

## 2018-03-20 ENCOUNTER — TRANSFERRED RECORDS (OUTPATIENT)
Dept: HEALTH INFORMATION MANAGEMENT | Facility: CLINIC | Age: 73
End: 2018-03-20

## 2018-03-21 ENCOUNTER — OFFICE VISIT (OUTPATIENT)
Dept: OPHTHALMOLOGY | Facility: CLINIC | Age: 73
End: 2018-03-21
Attending: OPHTHALMOLOGY
Payer: COMMERCIAL

## 2018-03-21 DIAGNOSIS — Z96.1 PSEUDOPHAKIA: Primary | ICD-10-CM

## 2018-03-21 PROCEDURE — G0463 HOSPITAL OUTPT CLINIC VISIT: HCPCS | Mod: ZF

## 2018-03-21 ASSESSMENT — TONOMETRY
OD_IOP_MMHG: 16
IOP_METHOD: ICARE
OS_IOP_MMHG: 20

## 2018-03-21 ASSESSMENT — SLIT LAMP EXAM - LIDS
COMMENTS: NORMAL
COMMENTS: NORMAL

## 2018-03-21 ASSESSMENT — EXTERNAL EXAM - RIGHT EYE: OD_EXAM: NORMAL

## 2018-03-21 ASSESSMENT — EXTERNAL EXAM - LEFT EYE: OS_EXAM: NORMAL

## 2018-03-21 ASSESSMENT — VISUAL ACUITY
OS_SC: 20/40-2
OD_SC: 20/30
METHOD: SNELLEN - LINEAR
OD_SC+: -2
OS_SC+: +2

## 2018-03-21 NOTE — PROGRESS NOTES
CC: s/p CE/IOL OD    HPI: 73yo CM with history of cataracts s/p FACS OD. Patient has history of macular degeneration.  No family history of glaucoma or AGE RELATED MACULAR DEGENERATION.    Doing well, no pain, redness, tearing, irritation. Vision is improved OD - feels like it is now his stronger eye. Had some soreness, but improved with alleve.    POHx:  S/p FACS CE/IOL OD 3/6/18  AMD OU  Glc suspect OU    Gtts:  AREDS II  ATs    A/P:  1. Cataracts OU s/p CE/IOL OD  - taper PF to TID OD  - DC ofloxacin QID OD  - DC acular QID OD  -bandage contact lens removed    Postoperative day #1 Cataract extraction/IOL OS  -doing well  Start pred four times a day   Start Oflox four times a day   Start Ketorolac four times a day   Eye shield at night  Precautions discussed    - eyeshield at night  - reviewed post-op instructions  - restart Latanoprost QHS OU    2. Glaucoma suspect  -IOP elevated with cupping both eyes  -retinal nerve fiber layer and pachymetry today  -Start latanoprost at bedtime both eyes  -perez visual field (HVF) at f/u    3. 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 I personally obtained the chief complaint(s) and history of present illness.  I confirmed and edited as necessary the review of systems, past medical/surgical history, family history, social history, and examination findings as documented by others; and I examined the patient myself. I personally reviewed the relevant tests, images, and reports as documented  above. I formulated and edited as necessary the assessment and plan and discussed the findings and management plan with the patient and family.    Lionel Barrios MD

## 2018-03-21 NOTE — PATIENT INSTRUCTIONS
Right Eye  Decrease prednisolone to two times a day   Stop Ketorolac  Stop Ofloxacin    Left Eye  Start prednisolone four times a day   Start Ofloxacin four times a day   Start Ketorolac four times a day

## 2018-03-21 NOTE — MR AVS SNAPSHOT
After Visit Summary   3/21/2018    Miki Busch    MRN: [id number]           Patient Information     Date Of Birth          1945        Visit Information        Provider Department      3/21/2018 3:00 PM Lionel Barrios MD [facility]        Today's Diagnoses     Pseudophakia - Both Eyes    -  1      Care Instructions    Right Eye  Decrease prednisolone to two times a day   Stop Ketorolac  Stop Ofloxacin    Left Eye  Start prednisolone four times a day   Start Ofloxacin four times a day   Start Ketorolac four times a day           Follow-ups after your visit        Your next 10 appointments already scheduled     Mar 28, 2018  3:00 PM CDT   Post-Op with Lionel Barrios MD   Eye Clinic ([facility])    [address] [address] [phone]            Apr 18, 2018  3:00 PM CDT   Post-Op with Lionel Barrios MD   Eye Clinic ([facility])    [address] [address] [phone]              Who to contact     Please call your clinic at [phone] to:    Ask questions about your health    Make or cancel appointments    Discuss your medicines    Learn about your test results    Speak to your doctor            Additional Information About Your Visit        [employer] Communications[employer] Information     Firm58 gives you secure access to your electronic health record. If you see a primary care provider, you can also send messages to your care team and make appointments. If you have questions, please call your primary care clinic.  If you do not have a primary care provider, please call [phone] and they will assist you.      Firm58 is an electronic gateway that provides easy, online access to your medical records. With Firm58, you can request a clinic appointment, read your test results, renew a prescription or communicate with your care team.     To access  your existing account, please contact your [facility] or call [phone] for assistance.        Care EveryWhere ID     This is your Care EveryWhere ID. This could be used by other organizations to access your [facility] medical records  [id number]         Blood Pressure from Last 3 Encounters:   03/06/18 134/87   02/23/18 124/86   04/27/17 133/89    Weight from Last 3 Encounters:   03/06/18 77.4 kg (170 lb 9.6 oz)   02/23/18 77.4 kg (170 lb 9.6 oz)   03/09/16 80.6 kg (177 lb 11.2 oz)              Today, you had the following     No orders found for display       Primary Care Provider Office Phone # Fax #    Peter Flower [phone]       [address] [address]        Equal Access to Services     MEGAN [facility]ЕКАТЕРИНА : Hadii gus adames hadasho Sogrant, waaxda luqadaha, qaybta kaalmada adeegyada, lalo link . So [facility] [phone].    ATENCIÓN: Si habla español, tiene a shelby disposición servicios gratuitos de asistencia lingüística. Carmen al [phone].    We comply with applicable federal civil rights laws and [place] laws. We do not discriminate on the basis of race, color, national origin, age, disability, sex, sexual orientation, or gender identity.            Thank you!     Thank you for choosing EYE CLINIC  for your care. Our goal is always to provide you with excellent care. Hearing back from our patients is one way we can continue to improve our services. Please take a few minutes to complete the written survey that you may receive in the mail after your visit with us. Thank you!             Your Updated Medication List - Protect others around you: Learn how to safely use, store and throw away your medicines at www.disposemymeds.org.          This list is accurate as of 3/21/18  4:52 PM.  Always use your most recent med list.                   Brand Name Dispense Instructions for use Diagnosis    gabapentin 300 MG  capsule    NEURONTIN    270 capsule    One by mouth three times a day.    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, bilateral       metoprolol tartrate 50 MG tablet    LOPRESSOR    180 tablet    Take 1 tablet (50 mg) by mouth 2 times daily    Essential hypertension, benign       mirtazapine 15 MG ODT tab    REMERON SOL-TAB    90 tablet    Take 1 tablet sublingually at bedtime    Insomnia, unspecified type       ofloxacin 0.3 % ophthalmic solution    OCUFLOX    1 Bottle    Place 1 drop into the right eye 4 times daily    Nuclear senile cataract of both eyes       pantoprazole 40 MG EC tablet    PROTONIX    90 tablet    Take 1 tablet by mouth daily    Chronic abdominal pain, Essential hypertension, benign       prednisoLONE acetate 1 % ophthalmic susp    PRED FORTE    1 Bottle    Place 1 drop into the right eye 4 times daily    Nuclear senile cataract of both eyes       traZODone 50 MG tablet    DESYREL    90 tablet    Take one half to one full tab at bedtime for sleep as needed    Insomnia, unspecified, DIAGNOSIS NOT YET DEFINED, Chronic low back pain, Chronic abdominal pain, Essential hypertension, benign, Routine general medical examination at a health care facility

## 2018-03-21 NOTE — NURSING NOTE
Chief Complaints and History of Present Illnesses   Patient presents with     Post Op (Ophthalmology) Right Eye     Post Op s/p CE/IOL OD (3/6/18) + HVF + OCT     Post Op (Ophthalmology) Left Eye     1 day Post Op s/p CE/IOL OS (3/20/18)     HPI    Symptoms:     No blurred vision   No decreased vision   No floaters   No flashes         Do you have eye pain now?:  No      Comments:  1 day s/p CE/IOL OS (3/20/18);  2wk Post Op s/p CE/IOL OD (3/6/18)     No concerns per patient, sees well w/o gls.   No floaters/flashes.     Ocular hx:  ARMD OU, Glc suspect OU + HVF + OCT    Ocular meds:  AREDS II, PF QID OU, ofloxacin QID OU, Latanoprost Qhs OU    Reports compliancy with the gtts schedule. No side effects except slight burn upon instillation w/oflox.     Masha Pizarro COT 3:28 PM March 21, 2018

## 2018-03-28 ENCOUNTER — OFFICE VISIT (OUTPATIENT)
Dept: OPHTHALMOLOGY | Facility: CLINIC | Age: 73
End: 2018-03-28
Attending: OPHTHALMOLOGY
Payer: COMMERCIAL

## 2018-03-28 DIAGNOSIS — Z96.1 PSEUDOPHAKIA: Primary | ICD-10-CM

## 2018-03-28 PROCEDURE — G0463 HOSPITAL OUTPT CLINIC VISIT: HCPCS | Mod: ZF

## 2018-03-28 ASSESSMENT — TONOMETRY
OS_IOP_MMHG: 17
IOP_METHOD: ICARE
OD_IOP_MMHG: 16

## 2018-03-28 ASSESSMENT — VISUAL ACUITY
OD_PH_SC: 20/20-2
METHOD: SNELLEN - LINEAR
OS_SC+: +1
OD_SC: 20/40
OS_SC: 20/40
OS_PH_SC: 20/25

## 2018-03-28 ASSESSMENT — CONF VISUAL FIELD
OS_NORMAL: 1
METHOD: COUNTING FINGERS
OD_NORMAL: 1

## 2018-03-28 ASSESSMENT — REFRACTION_WEARINGRX
OS_AXIS: 105
OS_SPHERE: -2.50
OS_CYLINDER: +0.25
OD_SPHERE: -7.50
OD_AXIS: 010
SPECS_TYPE: SVL
OD_CYLINDER: +1.25

## 2018-03-28 ASSESSMENT — SLIT LAMP EXAM - LIDS
COMMENTS: NORMAL
COMMENTS: NORMAL

## 2018-03-28 ASSESSMENT — EXTERNAL EXAM - LEFT EYE: OS_EXAM: NORMAL

## 2018-03-28 ASSESSMENT — EXTERNAL EXAM - RIGHT EYE: OD_EXAM: NORMAL

## 2018-03-28 NOTE — PROGRESS NOTES
CC: s/p CE/IOL OD    HPI: 73yo CM with history of cataracts s/p FACS OD. Patient has history of macular degeneration.  No family history of glaucoma or AGE RELATED MACULAR DEGENERATION.    Doing well, no pain, redness, tearing, irritation. Vision is improved OD - feels like it is now his stronger eye. Had some soreness, but improved with alleve.    POHx:  S/p FACS CE/IOL OD 3/6/18  AMD OU  Glc suspect OU    Gtts:  AREDS II  ATs  PF once a day OD  NV four times a day  OS  Oflox four times a day  OS  Ketorolac four times a day  OS      A/P:  1. Cataracts OU s/p CE/IOL OD  - taper PF to QD OD x 1 week, then stop  -bandage contact lens removed    Postoperative day #1 Cataract extraction/IOL OS  -doing well  Taper pred to three times a day x 1 week, then BID x 1week, then daily x 1week, then STOp  DC Oflox four times a day   DC Ketorolac four times a day   Eye shield at night  Precautions discussed    - eyeshield at night  - reviewed post-op instructions  - restart Latanoprost QHS OU    2. Glaucoma suspect  -IOP elevated with cupping both eyes  -retinal nerve fiber layer and pachymetry today  -Start latanoprost at bedtime both eyes  -perez visual field (HVF) at f/u    3. 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 I personally obtained the chief complaint(s) and history of present illness.  I confirmed and edited as necessary the review of systems, past medical/surgical history, family history, social history, and examination findings  as documented by others; and I examined the patient myself. I personally reviewed the relevant tests, images, and reports as documented above. I formulated and edited as necessary the assessment and plan and discussed the findings and management plan with the patient and family.    Lionel Barrios MD

## 2018-03-28 NOTE — MR AVS SNAPSHOT
After Visit Summary   3/28/2018    Miki Busch    MRN: [id number]           Patient Information     Date Of Birth          1945        Visit Information        Provider Department      3/28/2018 3:00 PM Lionel Barrios MD [facility] Clinic        Today's Diagnoses     Pseudophakia - Both Eyes    -  1       Follow-ups after your visit        Follow-up notes from your care team     Return in about 3 weeks (around 4/18/2018).      Your next 10 appointments already scheduled     Apr 18, 2018  3:00 PM CDT   Post-Op with Lionel Barrios MD   Eye Clinic ([facility])    [facility] [address]  [address] [place] [id number] [phone]              Who to contact     Please call your clinic at [phone] to:    Ask questions about your health    Make or cancel appointments    Discuss your medicines    Learn about your test results    Speak to your doctor            Additional Information About Your Visit        MyChart Information     BranchOut gives you secure access to your electronic health record. If you see a primary care provider, you can also send messages to your care team and make appointments. If you have questions, please call your primary care clinic.  If you do not have a primary care provider, please call [phone] and they will assist you.      BranchOut is an electronic gateway that provides easy, online access to your medical records. With BranchOut, you can request a clinic appointment, read your test results, renew a prescription or communicate with your care team.     To access your existing account, please contact your [facility] or call [phone] for assistance.        Care EveryWhere ID     This is your Care EveryWhere ID. This could be used by other organizations to access your [facility] medical records  [id number]         Blood Pressure from Last 3 Encounters:   03/06/18 134/87   02/23/18 124/86    04/27/17 133/89    Weight from Last 3 Encounters:   03/06/18 77.4 kg (170 lb 9.6 oz)   02/23/18 77.4 kg (170 lb 9.6 oz)   03/09/16 80.6 kg (177 lb 11.2 oz)              Today, you had the following     No orders found for display       Primary Care Provider Office Phone # Fax #    Peter Flower [phone]       [address] [address]        Equal Access to Services     [facility]: Hadii aad ku hadasho Soomaali, waaxda luqadaha, qaybta kaalmada adeegyada, lalo link . So [facility] [phone].    ATENCIÓN: Si habla español, tiene a shelby disposición servicios gratuitos de asistencia lingüística. Ll[facility] [phone].    We comply with applicable federal civil rights laws and [place] laws. We do not discriminate on the basis of race, color, national origin, age, disability, sex, sexual orientation, or gender identity.            Thank you!     Thank you for choosing EYE CLINIC  for your care. Our goal is always to provide you with excellent care. Hearing back from our patients is one way we can continue to improve our services. Please take a few minutes to complete the written survey that you may receive in the mail after your visit with us. Thank you!             Your Updated Medication List - Protect others around you: Learn how to safely use, store and throw away your medicines at www.disposemymeds.org.          This list is accurate as of 3/28/18  4:01 PM.  Always use your most recent med list.                   Brand Name Dispense Instructions for use Diagnosis    gabapentin 300 MG capsule    NEURONTIN    270 capsule    One by mouth three times a day.    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, bilateral       metoprolol tartrate 50 MG tablet    LOPRESSOR    180 tablet    Take 1 tablet (50 mg) by mouth 2 times daily    Essential hypertension, benign       mirtazapine 15 MG ODT tab    REMERON SOL-TAB    90 tablet    Take 1 tablet sublingually at bedtime    Insomnia, unspecified type       ofloxacin 0.3 % ophthalmic solution    OCUFLOX    1 Bottle    Place 1 drop into the right eye 4 times daily    Nuclear senile cataract of both eyes       pantoprazole 40 MG EC tablet    PROTONIX    90 tablet    Take 1 tablet by mouth daily    Chronic abdominal pain, Essential hypertension, benign       prednisoLONE acetate 1 % ophthalmic susp    PRED FORTE    1 Bottle    Place 1 drop into the right eye 4 times daily    Nuclear senile cataract of both eyes       traZODone 50 MG tablet    DESYREL    90 tablet    Take one half to one full tab at bedtime for sleep as needed    Insomnia, unspecified, DIAGNOSIS NOT YET DEFINED, Chronic low back pain, Chronic abdominal pain, Essential hypertension, benign, Routine general medical examination at a health care facility

## 2018-04-18 ENCOUNTER — OFFICE VISIT (OUTPATIENT)
Dept: OPHTHALMOLOGY | Facility: CLINIC | Age: 73
End: 2018-04-18
Attending: OPHTHALMOLOGY
Payer: COMMERCIAL

## 2018-04-18 DIAGNOSIS — Z96.1 PSEUDOPHAKIA: Primary | ICD-10-CM

## 2018-04-18 DIAGNOSIS — H35.3190 NONEXUDATIVE SENILE MACULAR DEGENERATION OF RETINA: ICD-10-CM

## 2018-04-18 DIAGNOSIS — H40.013 OPEN ANGLE GLAUCOMA CUPPING OF OPTIC DISCS, BILATERAL: ICD-10-CM

## 2018-04-18 PROCEDURE — 92134 CPTRZ OPH DX IMG PST SGM RTA: CPT | Mod: ZF | Performed by: OPHTHALMOLOGY

## 2018-04-18 PROCEDURE — 92083 EXTENDED VISUAL FIELD XM: CPT | Mod: ZF | Performed by: OPHTHALMOLOGY

## 2018-04-18 PROCEDURE — G0463 HOSPITAL OUTPT CLINIC VISIT: HCPCS | Mod: 25,ZF

## 2018-04-18 PROCEDURE — 92015 DETERMINE REFRACTIVE STATE: CPT | Mod: ZF

## 2018-04-18 ASSESSMENT — REFRACTION_MANIFEST
OD_SPHERE: -0.75
OD_AXIS: 010
OS_CYLINDER: SPHERE
OD_ADD: +2.25
OS_ADD: +2.25
OS_SPHERE: +0.00
OD_CYLINDER: +0.50

## 2018-04-18 ASSESSMENT — EXTERNAL EXAM - LEFT EYE: OS_EXAM: NORMAL

## 2018-04-18 ASSESSMENT — VISUAL ACUITY
OD_SC: 20/30
OD_SC+: -1
OD_PH_SC: 20/20-2
METHOD: SNELLEN - LINEAR
OS_SC: 20/20
OS_SC: J3
OD_SC: J3
OS_SC+: -1

## 2018-04-18 ASSESSMENT — CONF VISUAL FIELD
METHOD: COUNTING FINGERS
OS_NORMAL: 1
OD_NORMAL: 1

## 2018-04-18 ASSESSMENT — TONOMETRY
OD_IOP_MMHG: 15
IOP_METHOD: TONOPEN
OS_IOP_MMHG: 17

## 2018-04-18 ASSESSMENT — PACHYMETRY
OS_CT(UM): 549
OD_CT(UM): 534

## 2018-04-18 ASSESSMENT — SLIT LAMP EXAM - LIDS
COMMENTS: NORMAL
COMMENTS: NORMAL

## 2018-04-18 ASSESSMENT — EXTERNAL EXAM - RIGHT EYE: OD_EXAM: NORMAL

## 2018-04-18 NOTE — NURSING NOTE
Chief Complaints and History of Present Illnesses   Patient presents with     Post Op (Ophthalmology) Both Eyes       1 month after cataract surgery     HPI    Last Eye Exam:  3/28/18   Affected eye(s):  Both   Symptoms:        Duration:  1 month   Frequency:  Constant       Do you have eye pain now?:  No      Comments:  Miki is here today 1 month post op after cataract surgery OU  Re on 3-06 and left on 3-20  He says his distance vision is good and he has been using cheaters for near.     Steffen De Los Santos COT 3:39 PM April 18, 2018

## 2018-04-18 NOTE — PROGRESS NOTES
CC: s/p CE/IOL OD    HPI: 71yo CM with history of cataracts s/p FACS OD. Patient has history of macular degeneration.  No family history of glaucoma or AGE RELATED MACULAR DEGENERATION.    Interval:  Doing well, uses cheaters up close. Denies new flashes, pain, redness, floaters or photophobia.      POHx:  S/p FACS CE/IOL OD 3/6/18  S/p Cataract extraction with IOL OS 3/20/18  AMD OU  Glc suspect OU    Gtts:  AREDS II  Pred once a day both eyes  xalatan at bedtime OU    A/P:  1. Cataracts OU s/p CE/IOL both eyes  S/p FACS CE/IOL OD 3/6/18  S/p Cataract extraction with IOL OS 3/20/18  -doing well, mild mini-monovision OD  -Stop pred both eyes  - continue Latanoprost QHS OU    2. Glaucoma suspect  -IOP mid teens with cupping both eyes  -retinal nerve fiber layer and pachymetry 530s OU  -baseline HVF24-4 today:   OD: 2 inferotemporal spots of depression, reliable   OS: central and paracentral spots of depression, inferior arcuate scotoma, reliable  -continue latanoprost at bedtime both eyes  -Montior    3. 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 Barrios in 3 months, check RNFL OCT OU    FRANSISCO [place]  Cornea fellow    ~~~~~~~~~~~~~~~~~~~~~~~~~~~~~~~~~~~~~~~~~~~~~~~~~~~~~~~~~~~~~~~~    Complete documentation of historical and exam elements from today's encounter can be found in the full encounter summary report (not reduplicated in this progress note). I personally obtained the chief complaint(s) and history of present illness.  I confirmed and edited as necessary the review of systems, past medical/surgical history, family history, social history, and examination findings as documented by others; and I examined the patient  myself. I personally reviewed the relevant tests, images, and reports as documented above. I formulated and edited as necessary the assessment and plan and discussed the findings and management plan with the patient and family.    I personally viewed the [imaging] and I agree with the interpretation as documented by the resident/fellow and edited by me as appropriate.    Lionel Barrios MD

## 2018-04-18 NOTE — MR AVS SNAPSHOT
After Visit Summary   4/18/2018    Miki Busch    MRN: [id number]           Patient Information     Date Of Birth          1945        Visit Information        Provider Department      4/18/2018 3:00 PM Lionel [facility]rios MD [facility]        Today's Diagnoses     Pseudophakia - Both Eyes    -  1    Open angle glaucoma cupping of optic discs, bilateral        Nonexudative senile macular degeneration of retina           Follow-ups after your visit        Follow-up notes from your care team     Return in about 1 month (around 5/18/2018) for Follow Up with retina in 1 month, with Dr. Barrios in 3 months.      Future tests that were ordered for you today     Open Future Orders        Priority Expected Expires Ordered    OCT Optic Nerve RNFL Spectralis OU (both eyes) Routine  4/20/2018 4/19/2018            Who to contact     Please call your clinic at [phone] to:    Ask questions about your health    Make or cancel appointments    Discuss your medicines    Learn about your test results    Speak to your doctor            Additional Information About Your Visit        [employer] Information     [employer] gives you secure access to your electronic health record. If you see a primary care provider, you can also send messages to your care team and make appointments. If you have questions, please call your primary care clinic.  If you do not have a primary care provider, please call [phone] and they will assist you.      [employer] is an electronic gateway that provides easy, online access to your medical records. With [employer], you can request a clinic appointment, read your test results, renew a prescription or communicate with your care team.     To access your existing account, please contact your [facility] or call [phone] for assistance.        Care EveryWhere ID     This is your Care EveryWhere ID. This could be used by other organizations to access your [place]  medical records  [id number]         Blood Pressure from Last 3 Encounters:   03/06/18 134/87   02/23/18 124/86   04/27/17 133/89    Weight from Last 3 Encounters:   03/06/18 77.4 kg (170 lb 9.6 oz)   02/23/18 77.4 kg (170 lb 9.6 oz)   03/09/16 80.6 kg (177 lb 11.2 oz)              We Performed the Following     OCT Retina Spectralis OU (both eyes)     OVF 24-2 Dynamic OU        Primary Care Provider Office Phone # Fax #    Peter Flower [phone]       [address] [address]        Equal Access to Services     JUAN R ARROYO : Hadii gus pedersono Gareth, waaxda luqadaha, qaybta kaalmada adeegyada, lalo guthrie. So [facility] [phone].    ATENCIÓN: Si habla español, tiene a shelby disposición servicios gratuitos de asistencia lingüística. [facility] [phone].    We comply with applicable federal civil rights laws and [place] laws. We do not discriminate on the basis of race, color, national origin, age, disability, sex, sexual orientation, or gender identity.            Thank you!     Thank you for choosing [facility]  for your care. Our goal is always to provide you with excellent care. Hearing back from our patients is one way we can continue to improve our services. Please take a few minutes to complete the written survey that you may receive in the mail after your visit with us. Thank you!             Your Updated Medication List - Protect others around you: Learn how to safely use, store and throw away your medicines at www.disposemymeds.org.          This list is accurate as of 4/18/18 11:59 PM.  Always use your most recent med list.                   Brand Name Dispense Instructions for use Diagnosis    gabapentin 300 MG capsule    NEURONTIN    270 capsule    One by mouth three times a day.    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, bilateral       metoprolol tartrate 50 MG tablet    LOPRESSOR    180 tablet    Take 1 tablet (50 mg) by mouth 2 times daily    Essential hypertension, benign       mirtazapine 15 MG ODT tab    REMERON SOL-TAB    90 tablet    Take 1 tablet sublingually at bedtime    Insomnia, unspecified type       ofloxacin 0.3 % ophthalmic solution    OCUFLOX    1 Bottle    Place 1 drop into the right eye 4 times daily    Nuclear senile cataract of both eyes       pantoprazole 40 MG EC tablet    PROTONIX    90 tablet    Take 1 tablet by mouth daily    Chronic abdominal pain, Essential hypertension, benign       prednisoLONE acetate 1 % ophthalmic susp    PRED FORTE    1 Bottle    Place 1 drop into the right eye 4 times daily    Nuclear senile cataract of both eyes       traZODone 50 MG tablet    DESYREL    90 tablet    Take one half to one full tab at bedtime for sleep as needed    Insomnia, unspecified, DIAGNOSIS NOT YET DEFINED, Chronic low back pain, Chronic abdominal pain, Essential hypertension, benign, Routine general medical examination at a health care facility

## 2018-04-19 ASSESSMENT — CUP TO DISC RATIO
OS_RATIO: 0.9
OD_RATIO: 0.85

## 2018-05-09 DIAGNOSIS — I10 ESSENTIAL HYPERTENSION, BENIGN: ICD-10-CM

## 2018-05-10 RX ORDER — METOPROLOL SUCCINATE 50 MG/1
TABLET, EXTENDED RELEASE ORAL
Qty: 180 TABLET | Refills: 3 | OUTPATIENT
Start: 2018-05-10

## 2018-05-10 NOTE — TELEPHONE ENCOUNTER
[facility] Call Center    Phone Message    May a detailed message be left on voicemail: yes    Reason for Call: Medication Refill Request    Has the patient contacted the pharmacy for the refill? Yes   Name of medication being requested: metoprolol tartrate (LOPRESSOR) 50 MG tablet  Provider who prescribed the medication: Dr. Flower  Pharmacy: [facility]   Date medication is needed: 05/10/2018          Action Taken: Message routed to:  [facility] JIMÉNEZ

## 2019-01-01 ENCOUNTER — MYC MEDICAL ADVICE (OUTPATIENT)
Dept: OPHTHALMOLOGY | Facility: CLINIC | Age: 74
End: 2019-01-01

## 2019-01-01 ENCOUNTER — HEALTH MAINTENANCE LETTER (OUTPATIENT)
Age: 74
End: 2019-01-01

## 2019-01-01 ENCOUNTER — OFFICE VISIT (OUTPATIENT)
Dept: OPHTHALMOLOGY | Facility: CLINIC | Age: 74
End: 2019-01-01
Attending: OPHTHALMOLOGY
Payer: COMMERCIAL

## 2019-01-01 ENCOUNTER — OFFICE VISIT (OUTPATIENT)
Dept: FAMILY MEDICINE | Facility: CLINIC | Age: 74
End: 2019-01-01
Payer: COMMERCIAL

## 2019-01-01 ENCOUNTER — TELEPHONE (OUTPATIENT)
Dept: OPHTHALMOLOGY | Facility: CLINIC | Age: 74
End: 2019-01-01

## 2019-01-01 DIAGNOSIS — G89.29 CHRONIC ABDOMINAL PAIN: ICD-10-CM

## 2019-01-01 DIAGNOSIS — M54.50 CHRONIC LOW BACK PAIN WITHOUT SCIATICA, UNSPECIFIED BACK PAIN LATERALITY: ICD-10-CM

## 2019-01-01 DIAGNOSIS — H40.013 OPEN ANGLE GLAUCOMA CUPPING OF OPTIC DISCS, BILATERAL: ICD-10-CM

## 2019-01-01 DIAGNOSIS — H35.3190 NONEXUDATIVE SENILE MACULAR DEGENERATION OF RETINA: ICD-10-CM

## 2019-01-01 DIAGNOSIS — Z00.00 ROUTINE GENERAL MEDICAL EXAMINATION AT A HEALTH CARE FACILITY: ICD-10-CM

## 2019-01-01 DIAGNOSIS — G89.29 CHRONIC LOW BACK PAIN WITHOUT SCIATICA, UNSPECIFIED BACK PAIN LATERALITY: ICD-10-CM

## 2019-01-01 DIAGNOSIS — H40.10X0 OPEN-ANGLE GLAUCOMA: ICD-10-CM

## 2019-01-01 DIAGNOSIS — I10 ESSENTIAL HYPERTENSION, BENIGN: ICD-10-CM

## 2019-01-01 DIAGNOSIS — Z53.9 DIAGNOSIS NOT YET DEFINED: ICD-10-CM

## 2019-01-01 DIAGNOSIS — H35.3190 NONEXUDATIVE SENILE MACULAR DEGENERATION OF RETINA: Primary | ICD-10-CM

## 2019-01-01 DIAGNOSIS — R10.9 CHRONIC ABDOMINAL PAIN: ICD-10-CM

## 2019-01-01 DIAGNOSIS — G47.00 INSOMNIA, UNSPECIFIED TYPE: ICD-10-CM

## 2019-01-01 DIAGNOSIS — H40.1130 PRIMARY OPEN ANGLE GLAUCOMA OF BOTH EYES, UNSPECIFIED GLAUCOMA STAGE: ICD-10-CM

## 2019-01-01 DIAGNOSIS — H35.3221 EXUDATIVE AGE-RELATED MACULAR DEGENERATION OF LEFT EYE WITH ACTIVE CHOROIDAL NEOVASCULARIZATION (H): Primary | ICD-10-CM

## 2019-01-01 DIAGNOSIS — H35.30 AMD (AGE RELATED MACULAR DEGENERATION): ICD-10-CM

## 2019-01-01 DIAGNOSIS — H35.3231 EXUDATIVE AGE-RELATED MACULAR DEGENERATION, BILATERAL, WITH ACTIVE CHOROIDAL NEOVASCULARIZATION (H): Primary | ICD-10-CM

## 2019-01-01 DIAGNOSIS — Z96.1 PSEUDOPHAKIA OF BOTH EYES: ICD-10-CM

## 2019-01-01 LAB
ALBUMIN SERPL-MCNC: 3.8 G/DL (ref 3.4–5)
ALP SERPL-CCNC: 104 U/L (ref 40–150)
ALT SERPL W P-5'-P-CCNC: 12 U/L (ref 0–70)
ANION GAP SERPL CALCULATED.3IONS-SCNC: 2 MMOL/L (ref 3–14)
AST SERPL W P-5'-P-CCNC: 8 U/L (ref 0–45)
BASOPHILS # BLD AUTO: 0.1 10E9/L (ref 0–0.2)
BASOPHILS NFR BLD AUTO: 1.1 %
BILIRUB SERPL-MCNC: 0.5 MG/DL (ref 0.2–1.3)
BUN SERPL-MCNC: 11 MG/DL (ref 7–30)
CALCIUM SERPL-MCNC: 8.6 MG/DL (ref 8.5–10.1)
CHLORIDE SERPL-SCNC: 99 MMOL/L (ref 94–109)
CO2 SERPL-SCNC: 34 MMOL/L (ref 20–32)
CREAT SERPL-MCNC: 0.84 MG/DL (ref 0.66–1.25)
DIFFERENTIAL METHOD BLD: ABNORMAL
EOSINOPHIL # BLD AUTO: 0.1 10E9/L (ref 0–0.7)
EOSINOPHIL NFR BLD AUTO: 3 %
ERYTHROCYTE [DISTWIDTH] IN BLOOD BY AUTOMATED COUNT: 13.8 % (ref 10–15)
GFR SERPL CREATININE-BSD FRML MDRD: 85 ML/MIN/{1.73_M2}
GLUCOSE SERPL-MCNC: 102 MG/DL (ref 70–99)
HCT VFR BLD AUTO: 51.8 % (ref 40–53)
HGB BLD-MCNC: 16.2 G/DL (ref 13.3–17.7)
IMM GRANULOCYTES # BLD: 0 10E9/L (ref 0–0.4)
IMM GRANULOCYTES NFR BLD: 0.2 %
LYMPHOCYTES # BLD AUTO: 1.1 10E9/L (ref 0.8–5.3)
LYMPHOCYTES NFR BLD AUTO: 22.7 %
MCH RBC QN AUTO: 29.9 PG (ref 26.5–33)
MCHC RBC AUTO-ENTMCNC: 31.3 G/DL (ref 31.5–36.5)
MCV RBC AUTO: 96 FL (ref 78–100)
MONOCYTES # BLD AUTO: 0.5 10E9/L (ref 0–1.3)
MONOCYTES NFR BLD AUTO: 10.1 %
NEUTROPHILS # BLD AUTO: 2.9 10E9/L (ref 1.6–8.3)
NEUTROPHILS NFR BLD AUTO: 62.9 %
NRBC # BLD AUTO: 0 10*3/UL
NRBC BLD AUTO-RTO: 0 /100
PLATELET # BLD AUTO: 212 10E9/L (ref 150–450)
POTASSIUM SERPL-SCNC: 4.2 MMOL/L (ref 3.4–5.3)
PROT SERPL-MCNC: 7.4 G/DL (ref 6.8–8.8)
RBC # BLD AUTO: 5.42 10E12/L (ref 4.4–5.9)
SODIUM SERPL-SCNC: 134 MMOL/L (ref 133–144)
TSH SERPL DL<=0.005 MIU/L-ACNC: 1.07 MU/L (ref 0.4–4)
WBC # BLD AUTO: 4.7 10E9/L (ref 4–11)

## 2019-01-01 PROCEDURE — 67028 INJECTION EYE DRUG: CPT | Mod: LT,ZF | Performed by: OPHTHALMOLOGY

## 2019-01-01 PROCEDURE — 40000269 ZZH STATISTIC NO CHARGE FACILITY FEE: Mod: ZF

## 2019-01-01 PROCEDURE — G0463 HOSPITAL OUTPT CLINIC VISIT: HCPCS | Mod: ZF

## 2019-01-01 PROCEDURE — C9257 BEVACIZUMAB INJECTION: HCPCS | Mod: ZF | Performed by: OPHTHALMOLOGY

## 2019-01-01 PROCEDURE — 92235 FLUORESCEIN ANGRPH MLTIFRAME: CPT | Mod: ZF | Performed by: OPHTHALMOLOGY

## 2019-01-01 PROCEDURE — 92134 CPTRZ OPH DX IMG PST SGM RTA: CPT | Mod: ZF | Performed by: OPHTHALMOLOGY

## 2019-01-01 PROCEDURE — G0463 HOSPITAL OUTPT CLINIC VISIT: HCPCS | Mod: ZF,25

## 2019-01-01 PROCEDURE — 25000128 H RX IP 250 OP 636: Mod: ZF | Performed by: OPHTHALMOLOGY

## 2019-01-01 PROCEDURE — 92133 CPTRZD OPH DX IMG PST SGM ON: CPT | Mod: ZF | Performed by: OPHTHALMOLOGY

## 2019-01-01 RX ORDER — PANTOPRAZOLE SODIUM 40 MG/1
TABLET, DELAYED RELEASE ORAL
Qty: 90 TABLET | Refills: 3 | Status: SHIPPED | OUTPATIENT
Start: 2019-01-01

## 2019-01-01 RX ORDER — LATANOPROST 50 UG/ML
1 SOLUTION/ DROPS OPHTHALMIC AT BEDTIME
Qty: 7.5 ML | Refills: 0 | Status: SHIPPED | OUTPATIENT
Start: 2019-01-01

## 2019-01-01 RX ORDER — LATANOPROST 50 UG/ML
SOLUTION/ DROPS OPHTHALMIC
Qty: 7.5 ML | Refills: 0 | OUTPATIENT
Start: 2019-01-01

## 2019-01-01 RX ORDER — GABAPENTIN 300 MG/1
300 CAPSULE ORAL 3 TIMES DAILY
Qty: 45 CAPSULE | Refills: 0 | Status: SHIPPED | OUTPATIENT
Start: 2019-01-01 | End: 2019-01-01

## 2019-01-01 RX ORDER — GABAPENTIN 300 MG/1
300 CAPSULE ORAL 3 TIMES DAILY
Qty: 90 CAPSULE | Refills: 1 | OUTPATIENT
Start: 2019-01-01

## 2019-01-01 RX ORDER — GABAPENTIN 300 MG/1
300 CAPSULE ORAL 3 TIMES DAILY
Qty: 90 CAPSULE | Refills: 1 | Status: SHIPPED | OUTPATIENT
Start: 2019-01-01

## 2019-01-01 RX ORDER — GABAPENTIN 300 MG/1
CAPSULE ORAL
Qty: 90 CAPSULE | Refills: 0 | Status: SHIPPED | OUTPATIENT
Start: 2019-01-01 | End: 2019-01-01

## 2019-01-01 RX ORDER — GABAPENTIN 300 MG/1
CAPSULE ORAL
Qty: 45 CAPSULE | Refills: 0 | OUTPATIENT
Start: 2019-01-01

## 2019-01-01 RX ORDER — METOPROLOL TARTRATE 50 MG
50 TABLET ORAL 2 TIMES DAILY
Qty: 180 TABLET | Refills: 0 | Status: SHIPPED | OUTPATIENT
Start: 2019-01-01 | End: 2019-01-01

## 2019-01-01 RX ORDER — METOPROLOL TARTRATE 50 MG
50 TABLET ORAL 2 TIMES DAILY
Qty: 180 TABLET | Refills: 3 | Status: SHIPPED | OUTPATIENT
Start: 2019-01-01

## 2019-01-01 RX ORDER — PANTOPRAZOLE SODIUM 40 MG/1
TABLET, DELAYED RELEASE ORAL
Qty: 90 TABLET | Refills: 3 | OUTPATIENT
Start: 2019-01-01

## 2019-01-01 RX ORDER — LATANOPROST 50 UG/ML
1 SOLUTION/ DROPS OPHTHALMIC AT BEDTIME
Qty: 7.5 ML | Refills: 0 | Status: SHIPPED | OUTPATIENT
Start: 2019-01-01 | End: 2019-01-01

## 2019-01-01 RX ORDER — GABAPENTIN 300 MG/1
300 CAPSULE ORAL 3 TIMES DAILY
Qty: 90 CAPSULE | Refills: 1 | Status: SHIPPED | OUTPATIENT
Start: 2019-01-01 | End: 2019-01-01

## 2019-01-01 RX ORDER — GABAPENTIN 300 MG/1
300 CAPSULE ORAL 3 TIMES DAILY
Qty: 90 CAPSULE | Refills: 0 | OUTPATIENT
Start: 2019-01-01

## 2019-01-01 RX ORDER — MIRTAZAPINE 15 MG/1
TABLET, ORALLY DISINTEGRATING ORAL
Qty: 90 TABLET | Refills: 3 | Status: SHIPPED | OUTPATIENT
Start: 2019-01-01

## 2019-01-01 RX ADMIN — Medication 1.25 MG: at 14:46

## 2019-01-01 RX ADMIN — Medication 1.25 MG: at 16:04

## 2019-01-01 ASSESSMENT — VISUAL ACUITY
OS_SC: 20/30
OD_SC: 20/30
OD_SC+: -2
METHOD: SNELLEN - LINEAR
OS_SC+: -2/+3
METHOD_MR: DECLINED
OD_PH_SC: 20/20
OD_PH_SC: 20/20
OS_SC+: -1
OD_PH_SC+: -2
OD_SC: 20/30
OD_SC+: -2
OS_PH_SC: 20/20
OD_PH_SC: 20/20
OD_SC: 20/30
METHOD: SNELLEN - LINEAR
OS_SC: 20/20
METHOD: SNELLEN - LINEAR
OS_SC: 20/25
OD_PH_SC+: -2

## 2019-01-01 ASSESSMENT — EXTERNAL EXAM - LEFT EYE
OS_EXAM: NORMAL
OS_EXAM: NORMAL

## 2019-01-01 ASSESSMENT — CUP TO DISC RATIO
OS_RATIO: 0.9
OD_RATIO: 0.85
OS_RATIO: 0.9
OD_RATIO: 0.85

## 2019-01-01 ASSESSMENT — CONF VISUAL FIELD
OD_NORMAL: 1
OS_NORMAL: 1
METHOD: COUNTING FINGERS
OS_NORMAL: 1
OD_NORMAL: 1

## 2019-01-01 ASSESSMENT — TONOMETRY
OD_IOP_MMHG: 19
IOP_METHOD: TONOPEN
OS_IOP_MMHG: 17
OD_IOP_MMHG: 15
IOP_METHOD: TONOPEN
IOP_METHOD: TONOPEN
OS_IOP_MMHG: 13
OD_IOP_MMHG: 21
OD_IOP_MMHG: 16
OS_IOP_MMHG: 16
IOP_METHOD: APPLANATION
OS_IOP_MMHG: 20

## 2019-01-01 ASSESSMENT — SLIT LAMP EXAM - LIDS
COMMENTS: NORMAL

## 2019-01-01 ASSESSMENT — PATIENT HEALTH QUESTIONNAIRE - PHQ9: SUM OF ALL RESPONSES TO PHQ QUESTIONS 1-9: 0

## 2019-01-01 ASSESSMENT — REFRACTION_WEARINGRX
OS_SPHERE: +3.25
OD_SPHERE: +3.25
SPECS_TYPE: OTC

## 2019-01-01 ASSESSMENT — EXTERNAL EXAM - RIGHT EYE
OD_EXAM: NORMAL
OD_EXAM: NORMAL

## 2019-01-31 NOTE — TELEPHONE ENCOUNTER
Called patient, no answered. Left message with clinic number to call back to schedule annual physical due in February with Dr. Flower.

## 2019-01-31 NOTE — TELEPHONE ENCOUNTER
LVD PCC 2/23/2018. Scheduling has been notified to contact the pt for appointment.    03/06/18 134/87   02/23/18 124/86   04/27/17 133/89

## 2019-02-06 NOTE — TELEPHONE ENCOUNTER
Gabapentin 300 mg cap po TID         Last Written Prescription Date:  2/23/2018  Last Fill Quantity: 270,   # refills: 3  Last Office Visit : 2/28/2018  Future Office visit:  none    Routing refill request to provider for review/approval because:    Not on protocol    Scheduling has been notified to contact the pt for appointment.

## 2019-02-06 NOTE — TELEPHONE ENCOUNTER
Left message to call back to schedule annual physical with Dr. Flower in the Primary Care Clinic, last clinic visit was 02/23/18.

## 2019-02-07 NOTE — TELEPHONE ENCOUNTER
[facility] Call Center    Phone Message    May a detailed message be left on voicemail: yes    Reason for Call: Medication Refill Request    Has the patient contacted the pharmacy for the refill? Yes   Name of medication being requested: gabapentin (NEURONTIN) 300 MG capsule  Provider who prescribed the medication: Dr. Peter Flower  Pharmacy: [facility] in [place] off of [address]  Date medication is needed: now, if need be, ph# for [facility] is [phone], thank you!         Action Taken: Message routed to:  [facility] MED Refil Team for PCC

## 2019-02-07 NOTE — TELEPHONE ENCOUNTER
GABAPENTIN 300 MG CAPSULE     Last Written Prescription Date:  2/23/2018  Last Fill Quantity: 270,   # refills: 3  Last Office Visit :2/23/2018  Future Office visit:   None, has been contacted as noted in this encounter    Routing refill request to provider for review/approval because:  Drug not on   refill protocol

## 2019-03-13 NOTE — TELEPHONE ENCOUNTER
neurontin    Last Written Prescription Date:  2/7/19  Last Fill Quantity: 90   # refills: 0  Last Office Visit : 2/23/18  Future Office visit:  No future appt    Routing refill request to nurse for review/approval because:  Drug not on the [facility], P or  Health refill protocol or controlled substance  Alisha refill given 2/7/19  Scheduling has been notified to contact the pt for appointment.

## 2019-03-13 NOTE — TELEPHONE ENCOUNTER
Please call to schedule.    Thanks    Due for RTC     I do not need any follow up on the scheduling of this appointment unless the patient will no longer be receiving care in our clinic.

## 2019-04-12 NOTE — NURSING NOTE
Chief Complaint   Patient presents with     Refill Request      needs medication refill     Hypertension     follow up hypertension   Maritza Nelson LPN 3:07 PM on 4/12/2019  Will bring  Health Directive to clinic to have scanned into chart.Maritza Nelson LPN 3:09 PM on 4/12/2019

## 2019-04-12 NOTE — PROGRESS NOTES
[facility]   Peter Flower MD  04/12/2019      Chief Complaint:   Refill Request and Hypertension       History of Present Illness:   Miki Busch is a 73 year old male with a history of IBS and hypertension who presents for medication recheck and hypertension.    Patient reports he is doing well. His cataracts surgery this past year went well. He does not need to wear glasses except when he reads. He still occasionally smokes tobacco. He is taking all of his medication as prescribed.  He occasionally forgets to take is morning medication but will usually take it in the afternoon if he forgets. He is here to get a refill of his prescriptions.     Other concerns discussed:    Patient has not received new Shingle's vaccine.       Review of Systems:   Pertinent items are noted in HPI, remainder of complete ROS is negative.      Active Medications:     Current Outpatient Medications:      gabapentin (NEURONTIN) 300 MG capsule, Take 1 capsule (300 mg) by mouth 3 times daily, Disp: 45 capsule, Rfl: 0     ketorolac (ACULAR) 0.5 % ophthalmic solution, Place 1 drop into the right eye 4 times daily, Disp: 1 Bottle, Rfl: 0     latanoprost (XALATAN) 0.005 % ophthalmic solution, Place 1 drop into both eyes At Bedtime For additional refills, please schedule a follow-up appointment at [phone]., Disp: 7.5 mL, Rfl: 0     metoprolol tartrate (LOPRESSOR) 50 MG tablet, Take 1 tablet (50 mg) by mouth 2 times daily, Disp: 180 tablet, Rfl: 3     mirtazapine (REMERON SOL-TAB) 15 MG ODT, Take 1 tablet sublingually at bedtime, Disp: 90 tablet, Rfl: 3     ofloxacin (OCUFLOX) 0.3 % ophthalmic solution, Place 1 drop into the right eye 4 times daily, Disp: 1 Bottle, Rfl: 0     pantoprazole (PROTONIX) 40 MG EC tablet, Take 1 tablet by mouth daily, Disp: 90 tablet, Rfl: 3     prednisoLONE acetate (PRED FORTE) 1 % ophthalmic susp, Place 1 drop into the right eye 4 times daily, Disp: 1 Bottle, Rfl: 0     traZODone  (DESYREL) 50 MG tablet, Take one half to one full tab at bedtime for sleep as needed, Disp: 90 tablet, Rfl: 3      Allergies:   Penicillins      Past Medical History:  Biliary sludge  Essential hypertension  IBS  Cerebral infarction      Past Surgical History:  Cataracts, left   Keratotomy arcuate with femtosecond laser   Shoulder replacement  Polyps removal from colon     Family History:   Diabetes - father       Social History:   patient presents alone  Currently an everyday smoker (0.25 packs/day)  Denies drug or alcohol use     Physical Exam:   There were no vitals taken for this visit.   Constitutional: Alert. In no distress.  Head: Normocephalic. No masses, lesions, tenderness or abnormalities.  ENT: No neck nodes or sinus tenderness.  Cardiovascular: RRR. No murmurs, clicks, gallops, or rub.  Respiratory: Clear to auscultation bilaterally, no wheezes or crackles.  Gastrointestinal: Abdomen soft. Non-tender. BS normal. No masses or organomegaly.  Musculoskeletal: Extremities normal. No gross deformities noted. Normal muscle tone.  Skin: No suspicious lesions. No rashes.  Neurologic: Gait normal. Reflexes normal and symmetric. Sensation grossly WNL.  Psychiatric: Mentation appears normal. Normal affect.   Hematologic/Lymphatic/Immunologic: Normal cervical lymph nodes.      Assessment and Plan:  Chronic low back pain without sciatica, unspecified back pain laterality  This is helpful for his back pain.  - gabapentin (NEURONTIN) 300 MG capsule  Dispense: 45 capsule; Refill: 0    Chronic abdominal pain  PPI is helping and tolerable for chronic reflux  - gabapentin (NEURONTIN) 300 MG capsule  Dispense: 45 capsule; Refill: 0  - pantoprazole (PROTONIX) 40 MG EC tablet  Dispense: 90 tablet; Refill: 3    Essential hypertension, benign  - CBC with platelets differential  - Comprehensive metabolic panel  - TSH with free T4 reflex  - gabapentin (NEURONTIN) 300 MG capsule  Dispense: 45 capsule; Refill: 0  - metoprolol  tartrate (LOPRESSOR) 50 MG tablet  Dispense: 180 tablet; Refill: 3  - pantoprazole (PROTONIX) 40 MG EC tablet  Dispense: 90 tablet; Refill: 3    Insomnia, unspecified type  Well tolerated working well.   - mirtazapine (REMERON SOL-TAB) 15 MG ODT  Dispense: 90 tablet; Refill: 3     Suggested he gets the Shingle's vaccine at his local pharmacy. He is due for a colonoscopy next year but he will be 74 years old, we will decide whether to proceed with this at our next visit.     Follow-up: No follow-ups on file.      Scribe Disclosure:  Lionel URBINA, am serving as a scribe to document services personally performed by Peter Flower MD at this visit, based upon the provider's statements to me. All documentation has been reviewed by the aforementioned provider prior to being entered into the official medical record.    Scribe Preparation Attestation:  Lionel URBINA, served as a scribe preparing the chart for the clinic encounter through chart review for the provider team.      Portions of this medical record were completed by a scribe. UPON MY REVIEW AND AUTHENTICATION BY ELECTRONIC SIGNATURE, this confirms (a) I performed the applicable clinical services, and (b) the record is accurate.   Peter Flower MD

## 2019-04-29 NOTE — TELEPHONE ENCOUNTER
Left message at 1300 5-2-19  Reviewed refill team did not approve refill request  Pt overdue for appt  Provided direct number to review if seeing another provider now that is assisting in refills, if not to call direct number for scheduling-- able to provide refills until appt    -- left second message would send Rx for latanoprost.   Bean Gao RN 1:05 PM 05/02/19        LCV: 3-5-19, recommended 3 month f/u   NCV: none  Last Refill: 3-5-19 for 7.5 ml including instruction to schedule RTC  Refill Denied, FYI to clinic . Per protocol

## 2019-04-29 NOTE — TELEPHONE ENCOUNTER
gabapentin (NEURONTIN) 300 MG   Last Written Prescription Date:  4/12/19  Last Fill Quantity: 45,   # refills: 0  Last Office Visit : 4/12/19  Future Office visit: NONE    Routing refill request to provider for review/approval because: DRUG  not on the refill protocol

## 2019-05-07 NOTE — TELEPHONE ENCOUNTER
The patient left a message on the triage phone.  I returned the call and I left a message.  The patient is due back for a Dr. Barrios appointment and a retina appointment.  I gave the patient the direct line number.

## 2019-05-24 NOTE — TELEPHONE ENCOUNTER
gabapentin (NEURONTIN) 300 MG capsule      Last Written Prescription Date:  4/30/19  Last Fill Quantity: 90,   # refills: 1  Last Office Visit : 4/12/19  Future Office visit:  none    Routing refill request to provider for review/approval because:  Drug not on the [facility], [facility] or [facility] refill protocol

## 2019-05-28 NOTE — TELEPHONE ENCOUNTER
Gabapentin : refilled on 4/30/19 with 1 additional refill. Next fill date is 6/29. Pt needs to check with pharmacy.

## 2019-07-25 NOTE — PROGRESS NOTES
CC: s/p CE/IOL OD    HPI: 71yo CM with history of cataracts s/p FACS OD. Patient has history of macular degeneration.  No family history of glaucoma or AGE RELATED MACULAR DEGENERATION.    Interval:  Doing well, uses cheaters up close. Denies new flashes, pain, redness, floaters or photophobia.  Patient was previously recommended to follow up with retina but never scheduled an appointment.    POHx:  S/p FACS CE/IOL OD 3/6/18  S/p Cataract extraction with IOL OS 3/20/18  AMD OU  Glc suspect OU    Gtts:  AREDS II  xalatan at bedtime each eye    Testing:   -OCT mac today    OD with normal foveal contour    OS: pigment epithelial detachment and dusen (worsened from 2/18)    A/P:  1. Cataracts OU s/p CE/IOL both eyes  S/p FACS CE/IOL OD 3/6/18  S/p Cataract extraction with IOL OS 3/20/18   -doing well, mild mini-monovision OD   - continue Latanoprost QHS OU    2. Glaucoma suspect   -IOP mid teens with cupping both eyes   -retinal nerve fiber layer and pachymetry 530s OU   -baseline HVF24-4 4/2018    OD: 2 inferotemporal spots of depression, reliable    OS: central and paracentral spots of depression, inferior arcuate scotoma, reliable   -continue latanoprost at bedtime both eyes   -recommend repeat HVF 24-2/glaucoma follow up   -RNFL OCT grossly stable     3. 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   -conitnue AREDS   -OCT mac with worsening PED   - recommend patient have appointment with Retina specialist.    --  Rajeev Beal, DO  PGY 5, Cornea Fellow  Ophthalmology    Attending Physician Attestation:  Complete documentation of historical and exam elements from today's encounter can be found in the full encounter summary report (not reduplicated in this progress note).  I personally obtained the chief complaint(s) and history of present illness.  I confirmed and edited as necessary the review of systems,  past medical/surgical history, family history, social history, and examination findings as documented by others; and I examined the patient myself.  I personally reviewed the relevant tests, images, and reports as documented above.  I formulated and edited as necessary the assessment and plan and discussed the findings and management plan with the patient and family. I personally reviewed the ophthalmic test(s) associated with this encounter, agree with the interpretation(s) as documented by the resident/fellow, and have edited the corresponding report(s) as necessary. I was present for the key portions of the procedure and immediately available for the remainder. - Lionel Barrios MD    I personally spent great than 40min with the patient, of which >50% of the time was spent face to face with the patient, counseling and coordinating care with the patient. We discussed the complexity of his diagnosis, the need for further information prior to proceeding with yet another surgery, and the unknown prognosis for the patient at this time.    Lionel Barrios MD

## 2019-07-25 NOTE — TELEPHONE ENCOUNTER
Called patient and no answer. Left a voice message for patient to return to clinic TOMORROW morning 8:30am to see Dr. Cedillo sooner than later is better for his given eye condition.     I also attempted to call the daughter but there was no answer on her phone number as well. I did not leave a vm on her line.    I will reach out to a technician on retina service to attempt to have someone call Miki first thing in the morning RE his appointment at 8:30 am.

## 2019-07-30 NOTE — TELEPHONE ENCOUNTER
Left message 7-30-19 t [phone]  Reviewed appt details for tomorrow's appt with Dr. Chase and direct number to confirm appt  Bean Gao RN 1:14 PM 07/30/19

## 2019-07-31 NOTE — NURSING NOTE
Chief Complaints and History of Present Illnesses   Patient presents with     Macular Degeneration Evaluation     Chief Complaint(s) and History of Present Illness(es)     Macular Degeneration Evaluation     Laterality: both eyes    Onset: unknown    Frequency: constantly    Timing: throughout the day    Course: stable    Associated symptoms: Negative for eye pain, double vision, fever, flashes and floaters (Itching because of hay fever per pt)    Pain scale: 0/10              Comments     Latanoprost QHS BE / LD at 1:00 am  AREDS 2 BID / started taking last year before cataract surgery.  Porsha Sexton COT 1:36 PM 07/31/2019

## 2019-07-31 NOTE — LETTER
7/31/2019       RE: Miki Busch  [address]  [address] [place] [address]     Dear Justus,    Thank you for referring your patient, Miki Busch, to the EYE CLINIC at [facility]. Please see a copy of my visit note below.       CC:  Age related macular degeneration consult   HPI: Miki Busch is a  74 year old year-old patient referred by Dr Barrios for evaluation and treat of possible exudative macular degeneration.  S/P CE IOL each eye 3/2018. Vision has been very good after cataract surgery.  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 Nonexudative AMD right eye  Amsler chart education given; self screening every few days  Continue using AREDS II supplements    3. POAG, ou  Using latanoprost at bedtime each eye  IOP well controlled today    4. Pseudophakia, ou  RD precautions    Again, thank you for allowing me to participate in the care of your patient.      Sincerely,      Vonnie Chase MD   of Ophthalmology.  Retina Service   Department of Ophthalmology and Visual Neurosciences   [facility]  Phone: [phone]   Fax: [phone]

## 2019-07-31 NOTE — PROGRESS NOTES
CC:  Age related macular degeneration consult   HPI: Miki Busch is a  74 year old year-old patient referred by Dr Barrios for evaluation and treat of possible exudative macular degeneration.  S/P CE IOL each eye 3/2018. Vision has been very good after cataract surgery.  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 Nonexudative AMD right eye  Amsler chart education given; self screening every few days  Continue using AREDS II supplements    3. POAG, ou  Using latanoprost at bedtime each eye  IOP well controlled today    4. Pseudophakia, ou  RD precautions    Garry Best MD  Vitreoretinal surgery fellow  [facility]    ~~~~~~~~~~~~~~~~~~~~~~~~~~~~~~~~~~   Complete documentation of historical and exam elements from today's encounter can be found in the full encounter summary report (not reduplicated in this progress note).  I personally obtained the chief complaint(s) and history of present illness.  I confirmed and edited as necessary the review of systems, past medical/surgical history, family history, social history, and examination findings as documented by others; and I examined the patient myself.  I personally reviewed the relevant tests, images, and reports as  documented above.  I personally reviewed the ophthalmic test(s) associated with this encounter, agree with the interpretation(s) as documented by the resident/fellow, and have edited the corresponding report(s) as necessary.   I formulated and edited as necessary the assessment and plan and discussed the findings and management plan with the patient and family and No resident or fellow assisted with the procedures performed.  I performed the procedures myself.    Vonnie Chase MD   of Ophthalmology.  Retina Service   Department of Ophthalmology and Visual Neurosciences   [facility]  Phone: [phone]   Fax: [phone]

## 2019-08-05 NOTE — TELEPHONE ENCOUNTER
gabapentin (NEURONTIN) 300 MG capsule       Last Written Prescription Date:  4/30/19  Last Fill Quantity: 90,   # refills: 1  Last Office Visit : 4/12/19  Future Office visit:  none    Routing refill request to provider for review/approval because:  Drug not on the [facility], P or [facility] refill protocol or controlled substance

## 2019-08-05 NOTE — TELEPHONE ENCOUNTER
[facility] site reviewed. Last gabapentin refill received 7/11/19. Refill sent with start date of 8/10/19.    Krystal Marquez RN

## 2019-09-05 NOTE — NURSING NOTE
Chief Complaints and History of Present Illnesses   Patient presents with     Follow Up     Chief Complaint(s) and History of Present Illness(es)     Follow Up     Laterality: left eye    Onset: 5 weeks ago    Associated symptoms: Negative for eye pain, flashes and floaters    Pain scale: 0/10              Comments     Pt here for f/u wet AMD of the left eye - Avastin injection today  No change in vision noted since last visit    Temitope Reynolds COA COA 2:05 PM September 5, 2019

## 2019-09-05 NOTE — PROGRESS NOTES
CC:  Age related macular degeneration consult   HPI: Miki Busch is a  74 year old year-old patient referred by Dr Barrios for evaluation and treat of possible exudative macular degeneration.  S/P CE IOL each eye 3/2018. Vision has been very good after cataract surgery.  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 Nonexudative AMD right eye  Amsler chart education given; self screening every few days  Continue using AREDS II supplements    3. POAG, ou  Using latanoprost at bedtime each eye  IOP well controlled today    4. Pseudophakia, ou  RD precautions    ~~~~~~~~~~~~~~~~~~~~~~~~~~~~~~~~~~   Complete documentation of historical and exam elements from today's encounter can be found in the full encounter summary report (not reduplicated in this progress note).  I personally obtained the chief complaint(s) and history of present illness.  I confirmed and edited as necessary the review of systems, past medical/surgical history, family history, social history, and examination findings as documented by others; and I examined the patient myself.  I personally reviewed the relevant tests, images, and reports as documented above.  I personally reviewed the ophthalmic test(s) associated  with this encounter, agree with the interpretation(s) as documented by the resident/fellow, and have edited the corresponding report(s) as necessary.   I formulated and edited as necessary the assessment and plan and discussed the findings and management plan with the patient and family and No resident or fellow assisted with the procedures performed.  I performed the procedures myself.    Vonnie Chase MD   of Ophthalmology.  Retina Service   Department of Ophthalmology and Visual Neurosciences   [facility]  Phone: [phone]   Fax: [phone]

## 2019-12-15 ENCOUNTER — HEALTH MAINTENANCE LETTER (OUTPATIENT)
Age: 74
End: 2019-12-15

## 2021-01-15 ENCOUNTER — HEALTH MAINTENANCE LETTER (OUTPATIENT)
Age: 76
End: 2021-01-15

## 2021-08-01 NOTE — NURSING NOTE
Chief Complaints and History of Present Illnesses   Patient presents with     Follow Up For     1 week s/p CE/IOL LE     HPI    Affected eye(s):  Left   Symptoms:     No floaters   No flashes   No redness   No tearing   No Dryness   No itching         Do you have eye pain now?:  No      Comments:  Pt states vision has been great. No eye pain today.    Bob IVAN March 28, 2018 3:16 PM               
oral

## (undated) DEVICE — PACK CATARACT CUSTOM SO DALE SEY32CTFCX

## (undated) DEVICE — EYE KNIFE SLIT XSTAR VISITEC 2.4MM 45DEG BEVEL UP 373724

## (undated) DEVICE — EYE PACK BVI READYPAK KIT #1

## (undated) DEVICE — EYE SOL BSS 500ML

## (undated) DEVICE — TAPE MICROPORE 2"X1.5YD 1530S-2

## (undated) DEVICE — EYE PACK CUSTOM ANTERIOR 30DEG TIP CENTURION PPK6682-04

## (undated) DEVICE — LINEN TOWEL PACK X5 5464

## (undated) DEVICE — EYE SHIELD PLASTIC

## (undated) DEVICE — SYR 05ML SLIP TIP W/O NDL

## (undated) DEVICE — Device

## (undated) DEVICE — TAPE MICROPORE 1"X1.5YD 1530S-1

## (undated) DEVICE — GLOVE PROTEXIS MICRO 7.0  2D73PM70

## (undated) DEVICE — NDL 18GA 1.5" 305196

## (undated) DEVICE — GLOVE PROTEXIS MICRO 6.5  2D73PM65

## (undated) DEVICE — SU ETHILON 10-0 CS160-6 12" 9000G

## (undated) RX ORDER — LIDOCAINE HYDROCHLORIDE 10 MG/ML
INJECTION, SOLUTION EPIDURAL; INFILTRATION; INTRACAUDAL; PERINEURAL
Status: DISPENSED
Start: 2018-03-06

## (undated) RX ORDER — ONDANSETRON 2 MG/ML
INJECTION INTRAMUSCULAR; INTRAVENOUS
Status: DISPENSED
Start: 2018-03-06

## (undated) RX ORDER — NEOMYCIN SULFATE, POLYMYXIN B SULFATE AND DEXAMETHASONE 3.5; 10000; 1 MG/ML; [USP'U]/ML; MG/ML
SUSPENSION/ DROPS OPHTHALMIC
Status: DISPENSED
Start: 2018-03-06